# Patient Record
Sex: MALE | Race: WHITE | NOT HISPANIC OR LATINO | URBAN - METROPOLITAN AREA
[De-identification: names, ages, dates, MRNs, and addresses within clinical notes are randomized per-mention and may not be internally consistent; named-entity substitution may affect disease eponyms.]

---

## 2021-05-25 ENCOUNTER — RECORDS - HEALTHEAST (OUTPATIENT)
Dept: ADMINISTRATIVE | Facility: CLINIC | Age: 46
End: 2021-05-25

## 2021-05-28 ENCOUNTER — RECORDS - HEALTHEAST (OUTPATIENT)
Dept: ADMINISTRATIVE | Facility: CLINIC | Age: 46
End: 2021-05-28

## 2025-07-02 ENCOUNTER — HOSPITAL ENCOUNTER (EMERGENCY)
Facility: HOSPITAL | Age: 50
Discharge: PSYCHIATRIC OR SUBSTANCE USE FACILITY | End: 2025-07-03
Attending: EMERGENCY MEDICINE
Payer: COMMERCIAL

## 2025-07-02 DIAGNOSIS — F10.930 ALCOHOL WITHDRAWAL, UNCOMPLICATED (H): ICD-10-CM

## 2025-07-02 DIAGNOSIS — R45.851 SUICIDAL IDEATION: ICD-10-CM

## 2025-07-02 DIAGNOSIS — F10.920 ALCOHOLIC INTOXICATION WITHOUT COMPLICATION: ICD-10-CM

## 2025-07-02 DIAGNOSIS — F14.90 COCAINE USE: ICD-10-CM

## 2025-07-02 PROBLEM — F10.20 ALCOHOL USE DISORDER, SEVERE, DEPENDENCE (H): Status: ACTIVE | Noted: 2025-07-02

## 2025-07-02 PROBLEM — F33.9 RECURRENT MAJOR DEPRESSION: Status: ACTIVE | Noted: 2025-07-02

## 2025-07-02 LAB
ALBUMIN SERPL BCG-MCNC: 4.8 G/DL (ref 3.5–5.2)
ALP SERPL-CCNC: 86 U/L (ref 40–150)
ALT SERPL W P-5'-P-CCNC: 54 U/L (ref 0–70)
ANION GAP SERPL CALCULATED.3IONS-SCNC: 18 MMOL/L (ref 7–15)
AST SERPL W P-5'-P-CCNC: 45 U/L (ref 0–45)
BILIRUB SERPL-MCNC: 0.4 MG/DL
BUN SERPL-MCNC: 12.1 MG/DL (ref 6–20)
CALCIUM SERPL-MCNC: 9.1 MG/DL (ref 8.8–10.4)
CHLORIDE SERPL-SCNC: 100 MMOL/L (ref 98–107)
CREAT SERPL-MCNC: 0.85 MG/DL (ref 0.67–1.17)
EGFRCR SERPLBLD CKD-EPI 2021: >90 ML/MIN/1.73M2
ERYTHROCYTE [DISTWIDTH] IN BLOOD BY AUTOMATED COUNT: 12.9 % (ref 10–15)
ETHANOL SERPL-MCNC: 0.28 G/DL
GLUCOSE SERPL-MCNC: 104 MG/DL (ref 70–99)
HCO3 SERPL-SCNC: 22 MMOL/L (ref 22–29)
HCT VFR BLD AUTO: 43.5 % (ref 40–53)
HGB BLD-MCNC: 15.6 G/DL (ref 13.3–17.7)
MAGNESIUM SERPL-MCNC: 2.2 MG/DL (ref 1.7–2.3)
MCH RBC QN AUTO: 32.3 PG (ref 26.5–33)
MCHC RBC AUTO-ENTMCNC: 35.9 G/DL (ref 31.5–36.5)
MCV RBC AUTO: 90 FL (ref 78–100)
PLATELET # BLD AUTO: 191 10E3/UL (ref 150–450)
POTASSIUM SERPL-SCNC: 4.1 MMOL/L (ref 3.4–5.3)
PROT SERPL-MCNC: 7.5 G/DL (ref 6.4–8.3)
RBC # BLD AUTO: 4.83 10E6/UL (ref 4.4–5.9)
SODIUM SERPL-SCNC: 140 MMOL/L (ref 135–145)
WBC # BLD AUTO: 5.7 10E3/UL (ref 4–11)

## 2025-07-02 PROCEDURE — 84100 ASSAY OF PHOSPHORUS: CPT | Performed by: EMERGENCY MEDICINE

## 2025-07-02 PROCEDURE — 250N000013 HC RX MED GY IP 250 OP 250 PS 637: Performed by: EMERGENCY MEDICINE

## 2025-07-02 PROCEDURE — 99285 EMERGENCY DEPT VISIT HI MDM: CPT | Performed by: EMERGENCY MEDICINE

## 2025-07-02 PROCEDURE — 82077 ASSAY SPEC XCP UR&BREATH IA: CPT | Performed by: EMERGENCY MEDICINE

## 2025-07-02 PROCEDURE — 80053 COMPREHEN METABOLIC PANEL: CPT | Performed by: EMERGENCY MEDICINE

## 2025-07-02 PROCEDURE — 83735 ASSAY OF MAGNESIUM: CPT | Performed by: EMERGENCY MEDICINE

## 2025-07-02 PROCEDURE — 36415 COLL VENOUS BLD VENIPUNCTURE: CPT | Performed by: EMERGENCY MEDICINE

## 2025-07-02 PROCEDURE — 85027 COMPLETE CBC AUTOMATED: CPT | Performed by: EMERGENCY MEDICINE

## 2025-07-02 RX ORDER — DIAZEPAM 5 MG/1
5 TABLET ORAL ONCE
Status: COMPLETED | OUTPATIENT
Start: 2025-07-02 | End: 2025-07-02

## 2025-07-02 RX ADMIN — DIAZEPAM 5 MG: 5 TABLET ORAL at 21:42

## 2025-07-02 ASSESSMENT — COLUMBIA-SUICIDE SEVERITY RATING SCALE - C-SSRS
6. HAVE YOU EVER DONE ANYTHING, STARTED TO DO ANYTHING, OR PREPARED TO DO ANYTHING TO END YOUR LIFE?: YES
2. HAVE YOU ACTUALLY HAD ANY THOUGHTS OF KILLING YOURSELF IN THE PAST MONTH?: YES
4. HAVE YOU HAD THESE THOUGHTS AND HAD SOME INTENTION OF ACTING ON THEM?: NO
5. HAVE YOU STARTED TO WORK OUT OR WORKED OUT THE DETAILS OF HOW TO KILL YOURSELF? DO YOU INTEND TO CARRY OUT THIS PLAN?: NO
3. HAVE YOU BEEN THINKING ABOUT HOW YOU MIGHT KILL YOURSELF?: YES

## 2025-07-02 ASSESSMENT — ACTIVITIES OF DAILY LIVING (ADL)
ADLS_ACUITY_SCORE: 41

## 2025-07-03 ENCOUNTER — TELEPHONE (OUTPATIENT)
Dept: BEHAVIORAL HEALTH | Facility: CLINIC | Age: 50
End: 2025-07-03
Payer: COMMERCIAL

## 2025-07-03 ENCOUNTER — HOSPITAL ENCOUNTER (INPATIENT)
Facility: CLINIC | Age: 50
End: 2025-07-03
Attending: PSYCHIATRY & NEUROLOGY | Admitting: PSYCHIATRY & NEUROLOGY
Payer: COMMERCIAL

## 2025-07-03 VITALS
DIASTOLIC BLOOD PRESSURE: 94 MMHG | SYSTOLIC BLOOD PRESSURE: 157 MMHG | HEART RATE: 76 BPM | OXYGEN SATURATION: 96 % | RESPIRATION RATE: 16 BRPM | TEMPERATURE: 97.5 F

## 2025-07-03 VITALS
DIASTOLIC BLOOD PRESSURE: 97 MMHG | HEART RATE: 65 BPM | RESPIRATION RATE: 12 BRPM | WEIGHT: 239.5 LBS | TEMPERATURE: 98.3 F | SYSTOLIC BLOOD PRESSURE: 172 MMHG | BODY MASS INDEX: 36.42 KG/M2 | OXYGEN SATURATION: 94 %

## 2025-07-03 DIAGNOSIS — F10.20 ALCOHOL USE DISORDER, SEVERE, DEPENDENCE (H): ICD-10-CM

## 2025-07-03 DIAGNOSIS — F33.9 RECURRENT MAJOR DEPRESSIVE DISORDER, REMISSION STATUS UNSPECIFIED: Primary | ICD-10-CM

## 2025-07-03 PROBLEM — R45.851 SUICIDAL IDEATION: Status: ACTIVE | Noted: 2025-07-03

## 2025-07-03 LAB
AMPHETAMINES UR QL SCN: ABNORMAL
BARBITURATES UR QL SCN: ABNORMAL
BENZODIAZ UR QL SCN: ABNORMAL
BZE UR QL SCN: ABNORMAL
CANNABINOIDS UR QL SCN: ABNORMAL
FENTANYL UR QL: ABNORMAL
GLUCOSE BLDC GLUCOMTR-MCNC: 112 MG/DL (ref 70–99)
OPIATES UR QL SCN: ABNORMAL
PCP QUAL URINE (ROCHE): ABNORMAL
PHOSPHATE SERPL-MCNC: 3.6 MG/DL (ref 2.5–4.5)

## 2025-07-03 PROCEDURE — 258N000003 HC RX IP 258 OP 636: Performed by: EMERGENCY MEDICINE

## 2025-07-03 PROCEDURE — 250N000013 HC RX MED GY IP 250 OP 250 PS 637: Performed by: EMERGENCY MEDICINE

## 2025-07-03 PROCEDURE — 96360 HYDRATION IV INFUSION INIT: CPT | Performed by: EMERGENCY MEDICINE

## 2025-07-03 PROCEDURE — 99222 1ST HOSP IP/OBS MODERATE 55: CPT | Performed by: PHYSICIAN ASSISTANT

## 2025-07-03 PROCEDURE — 82962 GLUCOSE BLOOD TEST: CPT

## 2025-07-03 PROCEDURE — 250N000013 HC RX MED GY IP 250 OP 250 PS 637: Performed by: PSYCHIATRY & NEUROLOGY

## 2025-07-03 PROCEDURE — 80307 DRUG TEST PRSMV CHEM ANLYZR: CPT | Performed by: EMERGENCY MEDICINE

## 2025-07-03 PROCEDURE — 128N000001 HC R&B CD/MH ADULT

## 2025-07-03 RX ORDER — GABAPENTIN 300 MG/1
900 CAPSULE ORAL EVERY 8 HOURS
Status: DISCONTINUED | OUTPATIENT
Start: 2025-07-03 | End: 2025-07-03 | Stop reason: HOSPADM

## 2025-07-03 RX ORDER — DIAZEPAM 10 MG/2ML
5-10 INJECTION, SOLUTION INTRAMUSCULAR; INTRAVENOUS EVERY 30 MIN PRN
Status: DISCONTINUED | OUTPATIENT
Start: 2025-07-03 | End: 2025-07-03 | Stop reason: HOSPADM

## 2025-07-03 RX ORDER — GABAPENTIN 300 MG/1
600 CAPSULE ORAL EVERY 8 HOURS
Status: DISCONTINUED | OUTPATIENT
Start: 2025-07-06 | End: 2025-07-03 | Stop reason: HOSPADM

## 2025-07-03 RX ORDER — OLANZAPINE 10 MG/1
10 TABLET, FILM COATED ORAL 3 TIMES DAILY PRN
Status: ACTIVE | OUTPATIENT
Start: 2025-07-03

## 2025-07-03 RX ORDER — DIAZEPAM 5 MG/1
5 TABLET ORAL ONCE
Status: COMPLETED | OUTPATIENT
Start: 2025-07-03 | End: 2025-07-03

## 2025-07-03 RX ORDER — CLONIDINE HYDROCHLORIDE 0.1 MG/1
0.1 TABLET ORAL 2 TIMES DAILY PRN
Status: ACTIVE | OUTPATIENT
Start: 2025-07-03

## 2025-07-03 RX ORDER — GABAPENTIN 400 MG/1
1200 CAPSULE ORAL ONCE
Status: COMPLETED | OUTPATIENT
Start: 2025-07-03 | End: 2025-07-03

## 2025-07-03 RX ORDER — OLANZAPINE 5 MG/1
5-10 TABLET, ORALLY DISINTEGRATING ORAL EVERY 6 HOURS PRN
Status: DISCONTINUED | OUTPATIENT
Start: 2025-07-03 | End: 2025-07-03 | Stop reason: HOSPADM

## 2025-07-03 RX ORDER — SERTRALINE HYDROCHLORIDE 100 MG/1
200 TABLET, FILM COATED ORAL DAILY
Status: ON HOLD | COMMUNITY

## 2025-07-03 RX ORDER — HALOPERIDOL 5 MG/ML
2.5-5 INJECTION INTRAMUSCULAR EVERY 6 HOURS PRN
Status: DISCONTINUED | OUTPATIENT
Start: 2025-07-03 | End: 2025-07-03 | Stop reason: HOSPADM

## 2025-07-03 RX ORDER — DIAZEPAM 5 MG/1
5-20 TABLET ORAL EVERY 30 MIN PRN
Status: ACTIVE | OUTPATIENT
Start: 2025-07-03

## 2025-07-03 RX ORDER — ONDANSETRON 4 MG/1
4 TABLET, ORALLY DISINTEGRATING ORAL EVERY 6 HOURS PRN
Status: ACTIVE | OUTPATIENT
Start: 2025-07-03

## 2025-07-03 RX ORDER — MAGNESIUM HYDROXIDE/ALUMINUM HYDROXICE/SIMETHICONE 120; 1200; 1200 MG/30ML; MG/30ML; MG/30ML
30 SUSPENSION ORAL EVERY 4 HOURS PRN
Status: ACTIVE | OUTPATIENT
Start: 2025-07-03

## 2025-07-03 RX ORDER — ACETAMINOPHEN 325 MG/1
650 TABLET ORAL EVERY 4 HOURS PRN
Status: DISPENSED | OUTPATIENT
Start: 2025-07-03

## 2025-07-03 RX ORDER — OLANZAPINE 10 MG/2ML
10 INJECTION, POWDER, FOR SOLUTION INTRAMUSCULAR 3 TIMES DAILY PRN
Status: ACTIVE | OUTPATIENT
Start: 2025-07-03

## 2025-07-03 RX ORDER — LOPERAMIDE HYDROCHLORIDE 2 MG/1
2 CAPSULE ORAL 4 TIMES DAILY PRN
Status: ACTIVE | OUTPATIENT
Start: 2025-07-03

## 2025-07-03 RX ORDER — GABAPENTIN 300 MG/1
300 CAPSULE ORAL EVERY 8 HOURS
Status: DISCONTINUED | OUTPATIENT
Start: 2025-07-08 | End: 2025-07-03 | Stop reason: HOSPADM

## 2025-07-03 RX ORDER — TRAZODONE HYDROCHLORIDE 50 MG/1
50 TABLET ORAL
Status: ON HOLD | COMMUNITY

## 2025-07-03 RX ORDER — FOLIC ACID 1 MG/1
1 TABLET ORAL DAILY
Status: DISPENSED | OUTPATIENT
Start: 2025-07-03

## 2025-07-03 RX ORDER — FLUMAZENIL 0.1 MG/ML
0.2 INJECTION, SOLUTION INTRAVENOUS
Status: DISCONTINUED | OUTPATIENT
Start: 2025-07-03 | End: 2025-07-03 | Stop reason: HOSPADM

## 2025-07-03 RX ORDER — DIAZEPAM 10 MG/1
10 TABLET ORAL EVERY 30 MIN PRN
Status: DISCONTINUED | OUTPATIENT
Start: 2025-07-03 | End: 2025-07-03 | Stop reason: HOSPADM

## 2025-07-03 RX ORDER — MULTIPLE VITAMINS W/ MINERALS TAB 9MG-400MCG
1 TAB ORAL DAILY
Status: DISCONTINUED | OUTPATIENT
Start: 2025-07-03 | End: 2025-07-03 | Stop reason: HOSPADM

## 2025-07-03 RX ORDER — GABAPENTIN 100 MG/1
100 CAPSULE ORAL EVERY 8 HOURS
Status: DISCONTINUED | OUTPATIENT
Start: 2025-07-10 | End: 2025-07-03 | Stop reason: HOSPADM

## 2025-07-03 RX ORDER — FOLIC ACID 1 MG/1
1 TABLET ORAL DAILY
Status: DISCONTINUED | OUTPATIENT
Start: 2025-07-03 | End: 2025-07-03 | Stop reason: HOSPADM

## 2025-07-03 RX ORDER — CLONIDINE HYDROCHLORIDE 0.1 MG/1
0.1 TABLET ORAL EVERY 8 HOURS SCHEDULED
Status: DISCONTINUED | OUTPATIENT
Start: 2025-07-03 | End: 2025-07-03 | Stop reason: HOSPADM

## 2025-07-03 RX ORDER — MULTIPLE VITAMINS W/ MINERALS TAB 9MG-400MCG
1 TAB ORAL DAILY
Status: DISPENSED | OUTPATIENT
Start: 2025-07-03

## 2025-07-03 RX ORDER — HYDROXYZINE HYDROCHLORIDE 50 MG/1
50 TABLET, FILM COATED ORAL EVERY 6 HOURS PRN
Status: ON HOLD | COMMUNITY

## 2025-07-03 RX ORDER — HYDROXYZINE HYDROCHLORIDE 25 MG/1
25 TABLET, FILM COATED ORAL EVERY 4 HOURS PRN
Status: DISPENSED | OUTPATIENT
Start: 2025-07-03

## 2025-07-03 RX ORDER — TRAZODONE HYDROCHLORIDE 50 MG/1
50 TABLET ORAL
Status: ACTIVE | OUTPATIENT
Start: 2025-07-03

## 2025-07-03 RX ORDER — AMOXICILLIN 250 MG
1 CAPSULE ORAL 2 TIMES DAILY PRN
Status: ACTIVE | OUTPATIENT
Start: 2025-07-03

## 2025-07-03 RX ADMIN — Medication 1 TABLET: at 14:49

## 2025-07-03 RX ADMIN — HYDROXYZINE HYDROCHLORIDE 25 MG: 25 TABLET, FILM COATED ORAL at 14:49

## 2025-07-03 RX ADMIN — SODIUM CHLORIDE 1000 ML: 0.9 INJECTION, SOLUTION INTRAVENOUS at 09:24

## 2025-07-03 RX ADMIN — FOLIC ACID 1 MG: 1 TABLET ORAL at 14:49

## 2025-07-03 RX ADMIN — ACETAMINOPHEN 650 MG: 325 TABLET ORAL at 14:49

## 2025-07-03 RX ADMIN — FOLIC ACID 1 MG: 1 TABLET ORAL at 09:08

## 2025-07-03 RX ADMIN — GABAPENTIN 1200 MG: 400 CAPSULE ORAL at 09:07

## 2025-07-03 RX ADMIN — DIAZEPAM 10 MG: 10 TABLET ORAL at 09:32

## 2025-07-03 RX ADMIN — THIAMINE HCL TAB 100 MG 100 MG: 100 TAB at 14:49

## 2025-07-03 RX ADMIN — DIAZEPAM 5 MG: 5 TABLET ORAL at 00:51

## 2025-07-03 RX ADMIN — CLONIDINE HYDROCHLORIDE 0.1 MG: 0.1 TABLET ORAL at 09:14

## 2025-07-03 RX ADMIN — Medication 1 TABLET: at 09:08

## 2025-07-03 RX ADMIN — THIAMINE HCL TAB 100 MG 100 MG: 100 TAB at 09:08

## 2025-07-03 ASSESSMENT — ACTIVITIES OF DAILY LIVING (ADL)
ADLS_ACUITY_SCORE: 41
ADLS_ACUITY_SCORE: 15
ADLS_ACUITY_SCORE: 20
ADLS_ACUITY_SCORE: 41
ADLS_ACUITY_SCORE: 20
ADLS_ACUITY_SCORE: 41
ADLS_ACUITY_SCORE: 41
ADLS_ACUITY_SCORE: 20
ADLS_ACUITY_SCORE: 20
LAUNDRY: UNABLE TO COMPLETE
ADLS_ACUITY_SCORE: 41
DRESS: SCRUBS (BEHAVIORAL HEALTH)
ADLS_ACUITY_SCORE: 41
ADLS_ACUITY_SCORE: 20
ADLS_ACUITY_SCORE: 41
ORAL_HYGIENE: INDEPENDENT
ADLS_ACUITY_SCORE: 41
ADLS_ACUITY_SCORE: 41
HYGIENE/GROOMING: INDEPENDENT
DRESS: INDEPENDENT;SCRUBS (BEHAVIORAL HEALTH)
ADLS_ACUITY_SCORE: 41
ORAL_HYGIENE: INDEPENDENT
LAUNDRY: WITH SUPERVISION
ADLS_ACUITY_SCORE: 15
ADLS_ACUITY_SCORE: 41
ADLS_ACUITY_SCORE: 15
ADLS_ACUITY_SCORE: 20
ADLS_ACUITY_SCORE: 41
ADLS_ACUITY_SCORE: 41
HYGIENE/GROOMING: INDEPENDENT

## 2025-07-03 ASSESSMENT — LIFESTYLE VARIABLES
NAUSEA AND VOMITING: NO NAUSEA AND NO VOMITING
TREMOR: NO TREMOR
ANXIETY: NO ANXIETY, AT EASE
AUDITORY DISTURBANCES: NOT PRESENT
ORIENTATION AND CLOUDING OF SENSORIUM: ORIENTED AND CAN DO SERIAL ADDITIONS
AGITATION: NORMAL ACTIVITY
TREMOR: 5
ANXIETY: NO ANXIETY, AT EASE
TACTILE DISTURBANCES: MILD ITCHING, PINS AND NEEDLES, BURNING OR NUMBNESS
VISUAL DISTURBANCES: NOT PRESENT
HEADACHE, FULLNESS IN HEAD: NOT PRESENT
PAROXYSMAL SWEATS: BEADS OF SWEAT OBVIOUS ON FOREHEAD
TOTAL SCORE: 12
TOTAL SCORE: 1
AGITATION: SOMEWHAT MORE THAN NORMAL ACTIVITY
SKIP TO QUESTIONS 9-10: 0
PAROXYSMAL SWEATS: NO SWEAT VISIBLE
AUDITORY DISTURBANCES: NOT PRESENT
NAUSEA AND VOMITING: NO NAUSEA AND NO VOMITING
ORIENTATION AND CLOUDING OF SENSORIUM: ORIENTED AND CAN DO SERIAL ADDITIONS
HEADACHE, FULLNESS IN HEAD: NOT PRESENT
VISUAL DISTURBANCES: NOT PRESENT
TACTILE DISTURBANCES: VERY MILD ITCHING, PINS AND NEEDLES, BURNING OR NUMBNESS

## 2025-07-03 ASSESSMENT — PATIENT HEALTH QUESTIONNAIRE - PHQ9: SUM OF ALL RESPONSES TO PHQ9 QUESTIONS 1 & 2: 6

## 2025-07-03 ASSESSMENT — COLUMBIA-SUICIDE SEVERITY RATING SCALE - C-SSRS
6. HAVE YOU EVER DONE ANYTHING, STARTED TO DO ANYTHING, OR PREPARED TO DO ANYTHING TO END YOUR LIFE?: NO
1. SINCE LAST CONTACT, HAVE YOU WISHED YOU WERE DEAD OR WISHED YOU COULD GO TO SLEEP AND NOT WAKE UP?: NO
2. HAVE YOU ACTUALLY HAD ANY THOUGHTS OF KILLING YOURSELF?: NO
ATTEMPT SINCE LAST CONTACT: NO
TOTAL  NUMBER OF ABORTED OR SELF INTERRUPTED ATTEMPTS SINCE LAST CONTACT: NO
TOTAL  NUMBER OF INTERRUPTED ATTEMPTS SINCE LAST CONTACT: NO
SUICIDE, SINCE LAST CONTACT: NO

## 2025-07-03 NOTE — PHARMACY-ADMISSION MEDICATION HISTORY
Please see Admission Medication History note completed on 7/3 under previous encounter for information regarding prior to admission medications.    Malathi Liu, SavannahD

## 2025-07-03 NOTE — PLAN OF CARE
Goal Outcome Evaluation:    Plan of Care Reviewed With: patient      S-(situation): Voluntary admit from [facility] ED for alcohol use    B-(background): Patient presents here for alcohol detox.  First admit to 3 A per patient; has being to other treatment but not [facility].  Patient currently reports drinking 1L vodka a day for a while.  Last use was prior to admission.  Patient denies history of DT's, denies history of seizures.      Arrived at ED with ETOH of 0.28.  Per reports patient endorses acute mental health or medical concerns.  Suicidal ideation with no plan but vague thoughts but reports he wouldn't  do anything because it would affect too many people.  Medically cleared.  Patient denies other drug use.  Endorsing no symptoms of withdrawals.    A-(assessment): Patient pleasant and cooperative upon arrived the unit.  Flat affect, denies SI/SIB, endorsed depression/anxiety.  Per patient, denied suicidal thoughts, toney for safety on this unit.      MSSA on admission 4, no valium given.  Received prn hydroxyzine for anxiety, tylenol for pain.  Good appetite, medication compliant.  Patient was seen by internal medicine.    COVID Symptoms: No, if yes, COVID test required.    Utox: Positive for cocaine and benzo's    Magnesium: WNL    CBC: WNL    HCG: N/A    Vitals:  T 98.1 /  R  16 /  HR  68 /  B/P 160/92  /  02 sat 96% Ra     R-(recommendations): MSSA with valium    Lab draw: 7/3/25:  TSH, Lipids, Hgb A1C    Psychiatry, internal medicine, case management to meet with patient    Patient would like to go to treatment after detox.

## 2025-07-03 NOTE — ED NOTES
He was informed he was going to a locked unit where he is not allowed to have a cell phone on the unit. He does not have a cell phone at this time. He is agreeable with the plan for the locked unit.

## 2025-07-03 NOTE — ED TRIAGE NOTES
Pt comes in today with police escorts with suicidal ideation, denies plan at this time. Patient states he has been binge drinking with his last drink about an hour ago. Endorses 1L vodka today. [facility] and was isolating and someone called for his well being. Alert and oriented, cooperative at this time.      Triage Assessment (Adult)       Row Name 07/02/25 1948          Triage Assessment    Airway WDL WDL        Respiratory WDL    Respiratory WDL WDL        Skin Circulation/Temperature WDL    Skin Circulation/Temperature WDL WDL        Cardiac WDL    Cardiac WDL WDL        Peripheral/Neurovascular WDL    Peripheral Neurovascular WDL WDL        Cognitive/Neuro/Behavioral WDL    Cognitive/Neuro/Behavioral WDL WDL

## 2025-07-03 NOTE — ED PROVIDER NOTES
"EMERGENCY DEPARTMENT ENCOUNTER      NAME: Everette Gomez  AGE: 49 year old male  YOB: 1975  EVALUATION DATE & TIME: 7/2/2025  8:36 PM    ED PROVIDER: Betty Suero MD    Chief Complaint   Patient presents with    Suicidal       FINAL IMPRESSION  1. Alcoholic intoxication without complication    2. Suicidal ideation        MEDICAL DECISION MAKING   Everette Gomez is a 49 year old male who presents via EMS  with police escort for evaluation of suicidal ideation and alcohol intoxication/withdrawal.  Patient reports that he has been struggling with his mental health and alcohol use.  He reports that he has been drinking half to 1 L of vodka per day with last drink being just prior to arrival.  He has a history of alcohol withdrawal and reports that in the past, he had to be put into a \"medically induced coma\" to manage this.  He denies history of seizures.  He does feel like he might be experiencing some symptoms of withdrawal now and describes abdominal discomfort and shakiness.  Second, patient endorses having ongoing thoughts of harming himself without a specific plan, though has had thoughts of jumping off a bridge, jumping off a building, and slitting his wrists.  He does not have any report of hallucinations or homicidal ideation.  He reports that he has been taking medications for his mental health, though when asked to clarify, he states that he actually has been drinking alcohol to help with his mood rather than taking medications.  He denies other drug use.    I considered a broad differential including but not limited to alcohol or other substance intoxication/withdrawal, ROJELIO, gastritis, gastroenteritis, pancreatitis, hepatobiliary disease. Discussed workup and management with patient. We agreed on plan for labs, PO anxiolytic. Will also have patient meet with DEC team to obtain additional information and collateral given report of SI with plans. Patient currently voluntary but I do believe " would meet criteria for a hold if he attempts to leave.     ED Course as of 07/03/25 0439   Wed Jul 02, 2025 2302 Ethanol Level Blood(!): 0.28  Elevated and consistent with patient report of having consumed alcohol just prior to arrival.  With this, I have lower suspicion for alcohol withdrawal.   2302 Comprehensive metabolic panel(!)  CMP with mild elevation of anion gap at 18 but otherwise unremarkable without evidence of kidney injury, electrolyte derangement, or hepatobiliary disease.   2302 CBC (+ platelets, no diff)  CBC reassuring. No evidence of leukocytosis to suggest systemic infectious/inflammatory process. No acute anemia. PLTs wnl.   2303 Magnesium: 2.2  Within normal limits, reassuring.     Workup was notable for the above. I rechecked the patient multiple times and reviewed results.  Patient had improvement in anxiety and symptoms after a dose of Valium and did not display any obvious symptoms of withdrawal.      Patient was evaluated by Radha with DEC team who obtained additional information and collateral. Please see that note for details. We discussed history, presenting complaints/symptoms, and options for management.  Patient would be a good candidate for MICD treatment at [facility] and he was comfortable with this recommendation.  I spoke with staff at [facility] who will work on arranging bed placement.  Patient was signed out to night ED provider pending transfer to [facility] for admission.  He is currently here on a voluntary basis and I do believe would meet criteria for hold if he attempts to leave.      Additional Considerations in MDM  History:  Supplemental history from: PD  External Record(s) reviewed: 11/30/2023 [facility] - PCP visit     Work Up:  Chart documentation includes differential diagnoses considered and any EKGs or imaging independently interpreted as specified above.   In additional to work up documented, I considered additional advanced imaging and laboratory workup  "but deferred after shared decision making conversation with patient/family    External Consultation(s):  Discussion of management with another provider as documented above and in ED course     Chronic Illness(es):  Care impacted by chronic illness(es): Alcohol and/or Drug Abuse or Dependence    Disposition considerations: Transfer for admission    MIPS: Not Applicable       Sepsis/STEMI/Stroke: None        ED COURSE  9:31 PM I met with the patient, obtained history, performed an initial exam, and discussed options and plan for diagnostics and treatment here in the ED.   12:37 AM I rechecked and updated the patient on results.    12:39 AM I spoke with the DEC . She recommends that we send the patient to [facility].   1:31 AM I attempted to call [facility]. Waited on hold. Will try again later.   2:08 AM I spoke again with [facility]. They are working on getting a bed for the patient.       MEDICATIONS GIVEN IN THE ED  Medications   diazepam (VALIUM) tablet 5 mg (5 mg Oral $Given 7/2/25 2142)   diazepam (VALIUM) tablet 5 mg (5 mg Oral $Given 7/3/25 0051)       NEW PRESCRIPTIONS STARTED AT TODAY'S VISIT  New Prescriptions    No medications on file          =================================================================    HPI:    Use of : N/A      Everette Gomez is a 49 year old male who presents with depression.     When asked why he is here, the patient states \"I'm a loser\" and says \"maybe I came by mistake\". He has a flat affect. He endorses depression stating \"I don't feel like I wanna live\" and when asked how we could help, he calmly says \"euthanization would be great\". He does not have a plan and elaborates that he is concerned about the trauma that others would have finding him if he killed himself. He says \"anything you do harms other people\" and says that jumping off of a bridge or tall building would mean that someone else would have to find his \"splat\". He also discusses " "how other methods of self harm are \"too slow\" and that he would \"feel bad for the mary finding me sitting in a pool of blood\" if he cut his wrists.     The patient has not been taking prescribed medications. Instead, he says that the medicine he takes for mental health is alcohol. He admits that he has \"a problem with the alcohol and mental health stuff\". He drinks 0.5-1L of vodka each day. His last drink was just prior to PD arrival earlier today. Right now he says he feels like he is going withdrawal. His symptoms include RUQ abdominal pain, \"shakes\", and \"it feels like every muscle in my body wants to flex\". He has never had an alcohol withdrawal seizure. He reports a history of a \"medically induced coma for withdrawal\" in the past at [facility]. He denies any other complaints at this time.       Per PD, the patient lives in a veterans home and was isolating so someone called PD for a wellness check. He reported recent binge drinking (~1L today) with most recent drink 1 hour prior to arrival. Alert, oriented, and cooperative with PD. In triage he endorsed suicidal ideation but no plan.       RELEVANT HISTORY, MEDICATIONS, & ALLERGIES   Past medical history, surgical history, family history, medications, and allergies reviewed and pertinent noted in HPI.    REVIEW OF SYSTEMS:  A complete review of systems was performed with pertinent positives and negatives noted in the HPI.     PHYSICAL EXAM:    Vitals: BP (!) 161/105   Pulse 92   Temp 97.5  F (36.4  C) (Oral)   Resp 20   Wt 108.6 kg (239 lb 8 oz)   SpO2 97%   BMI 36.42 kg/m    General: Awake, alert, interactive. Standing up at bedside. Pacing. Appears anxious and paranoid.   HENT: Atraumatic. MMM.   Neck: Full AROM.  Cardiovascular: Regular rate.  Respiratory/Chest: Normal work of breathing.   Abdomen: Non-distended.   Musculoskeletal: Normal appearing extremities without obvious deformities or signs of trauma.   Skin: Normal color. No rash or " "diaphoresis.  Neurologic: Alert, oriented. Speech clear. CN's grossly intact. Moving all extremities spontaneously.   Psychiatric: Patient reports mood as \"depressed.\" Affect appears flat,  congruent with mood. Eye contact fair. Does not appear to be responding to internal stimuli but appears paranoid. Thought process and content organized, no delusions. Speech normal, not tangential or pressured. Endorses passive suicidal ideation with various plan. Denies homicidal ideation, visual hallucinations, or auditory hallucinations.        LAB  Labs Ordered and Resulted from Time of ED Arrival to Time of ED Departure   COMPREHENSIVE METABOLIC PANEL - Abnormal       Result Value    Sodium 140      Potassium 4.1      Carbon Dioxide (CO2) 22      Anion Gap 18 (*)     Urea Nitrogen 12.1      Creatinine 0.85      GFR Estimate >90      Calcium 9.1      Chloride 100      Glucose 104 (*)     Alkaline Phosphatase 86      AST 45      ALT 54      Protein Total 7.5      Albumin 4.8      Bilirubin Total 0.4     ETHANOL LEVEL BLOOD - Abnormal    Ethanol Level Blood 0.28 (*)    MAGNESIUM - Normal    Magnesium 2.2     CBC WITH PLATELETS - Normal    WBC Count 5.7      RBC Count 4.83      Hemoglobin 15.6      Hematocrit 43.5      MCV 90      MCH 32.3      MCHC 35.9      RDW 12.9      Platelet Count 191         RADIOLOGY  No orders to display           I, Mitchell Ewing, am serving as a scribe to document services personally performed by Dr. Betty Suero based on my observation and the provider's statements to me. I, Betty Suero MD attest that Mithcell Ewing is acting in a scribe capacity, has observed my performance of the services and has documented them in accordance with my direction.    Betty Suero M.D.  Emergency Medicine  [facility] EMERGENCY DEPARTMENT  [address] [place] [id number]  [phone]  Dept: [phone]     Betty Suero MD  07/03/25 0439    "

## 2025-07-03 NOTE — ED NOTES
He is currently sleeping. He is under 1:1 via camera at this time. Waiting for placement inpatient mental health.

## 2025-07-03 NOTE — ED PROVIDER NOTES
[facility] EMERGENCY DEPARTMENT   ED Mental Health Observation - Initiation Note    Everette Gomez was placed into observation at 12:39 AM (per Dr. Suero's ED  note) on 7/3/2025 for Mental health crisis.   Patient is expected to be under observation status for a minimum of eight hours.    MD Nata Stout, Nisha Mathias MD  07/03/25 0894

## 2025-07-03 NOTE — CONSULTS
"[facility]  Consult Note - Hospitalist Service  Date of Admission:  7/3/2025  Consult Requested by: Dr. Pang  Reason for Consult: detox    Assessment & Plan   Everette Gomez is a 49 year old male admitted on 7/3/2025. He has a past medical history of alcohol use disorder, HLD, MDD. Presented in the setting of alcohol withdrawal and suicidal ideation. Was admitted to  for detox. Internal medicine consulted for medical comanagement.   ______________________    # Alcohol withdrawal, hx of alcohol use disorder   # MDD, suicidal ideation   # Hypertension in the setting of withdrawal   Presented via EMS  with police escort for evaluation of suicidal ideation and alcohol intoxication/withdrawal. MSSA 4 this shift.  Denied hx of withdrawal seizures. No hx of Dts.  Blood EtOH on arrival was 0.28. Liver enzymes within normal limits. Patient reports that he has been struggling with his mental health and alcohol use. He reports that he has been drinking half to 1 L of vodka per day with last drink being just prior to arrival to the [facility] 07/02. He has a history of alcohol withdrawal and reports that in the past, he had to be put into a \"medically induced coma\" to manage this. On arrival, did have suicidal ideation with thoughts of jumping off a bridge, jumping off a building, and slitting his wrists. UDS positive for benzos and cocaine.   - Continue MSSA with valium   - Folvite, multi-vites, thiamine supplementation   - PTA hydroxyzine PRN, sertraline daily, and trazodone at bedtime PRN per Psychiatry   - Further management per Psychiatry   - Suicide precautions   - PRN clonidine added for hypertension     # Elevated blood pressure  Patient denies personal history of high blood pressure.  Denies headache, vision changes, chest pain, dyspnea.  Suspect elevated blood pressure in the setting of acute withdrawal.  - As needed clonidine 0.1 mg twice daily as needed for elevated " blood pressure  - Please reach out to Medicine team if patient's blood pressure is persistently elevated >180 / >105 and patient is out of withdrawal or if patient develops symptoms related to high blood pressure      # Elevated anion gap   With acidosis. Suspect in the setting of above. No need for recheck at this time.   - Encourage alcohol cessation, PO intake     # Hyperlipidemia  Lipid screen in 2023 demonstrated cholesterol 214, triglycerides 211.   - Not currently on statin  - Follow up with PCP for repeat labs, discussion on ways to lower lipids including alcohol cessation  (referral place)    # Nicotine use disorder: declined NRT, no cravings     Resolved:   # RUQ abdominal discomfort, resolved   Reported as abdominal cramping with loose stools, which has now resolved. LFTs within normal limits and resolution of abdominal pain.   -Please reach out to Medicine team if abdominal pain returns of patient develops nausea, vomiting     Medicine team will sign off at this time. Thank you for the consult. Please reach out to Medicine team if further questions or concerns arise.        The patient's care was discussed with the Bedside Nurse, Patient, and Primary team.    Clinically Significant Risk Factors Present on Admission                                 # Financial/Environmental Concerns:           Jazmine Robbins PA-C  Hospitalist Service  Securely message with [employer] (more info)  Text page via [facility] Paging/Directory   ______________________________________________________________________    Chief Complaint   Withdrawal     History is obtained from the patient    History of Present Illness   Everette Gomez is a 49 year old male who presented to ED 7/2 via EMS  with police escort for evaluation of suicidal ideation and alcohol intoxication/withdrawal.  Patient reports that he has been struggling with his mental health and alcohol use.  He reports that he has been drinking half to 1 L of vodka per day with last  "drink being just prior to arrival.  He has a history of alcohol withdrawal and reports that in the past, he had to be put into a \"medically induced coma\" to manage this.  He denies history of seizures or Dts.     On arrival, patient endorses having ongoing thoughts of harming himself without a specific plan, though has had thoughts of jumping off a bridge, jumping off a building, and slitting his wrists. Though further elaborated that he wouldn't want to cause the trauma to person who would find his body.     The patient lives in a veterans home and was isolating so someone called PD for a wellness check.     Did note abdominal pain on arrival which has since resolved.      Current withdrawal symptoms include tremor, brain fog.  No longer is having any nausea, vomiting, abdominal pain.  No hallucinations and denies headache.  Notes was experiencing abdominal pain and left sided pain when he was at [facility].  Abdominal pain was consistent with cramping.  Notes he had a few loose stools in the setting of alcohol withdrawal.  Notes he has bodyaches with withdrawal previously, notes this is all very typical from prior withdrawal.  All the symptoms all have since resolved.  Denies any black or blood in his stools.  Has not had a bowel movement since this morning.  Speaks in detail about the challenging past few years that he had.  Describes that he started drinking alcohol during the pandemic in 2020 when he was living in an apartment in [place] and then 1 day he decided to leave his emergency room the [facility].  He was then committed at [facility] and underwent treatment which lasted 6 months.  He says that 1 day they came to him while he was in treatment and told him \"it is time for you to leave\".  He noted he was discharged and continue to roam the streets which prompted him to drink.  He notes he has not been taking his medications consistently while he was drinking.  Poor p.o. intake as well.  Notes he has not " "followed up with a primary care doctor and also thinks he may to follow-up with a psychiatrist as this would benefit him.  He needs to get in touch with his .  Denies any other symptoms outside of withdrawal including fever, chills, dizziness, lightheadedness, chest pain, dyspnea, cough, urinary symptoms, swelling in legs or new rashes. States he \"wishes he were dead\", but doesn't have a specific plan or would take action upon this wish.     Past Medical History    No past medical history on file.    Past Surgical History   No past surgical history on file.    Medications   I have reviewed this patient's current medications       Review of Systems    The 10 point Review of Systems is negative other than noted in the HPI or here.     Social History   I have reviewed this patient's social history and updated it with pertinent information if needed.         Family History         Allergies   No Known Allergies     Physical Exam   Vital Signs: Temp: 98.1  F (36.7  C) Temp src: Oral BP: (!) 160/92 Pulse: 68   Resp: 16 SpO2: 96 % O2 Device: None (Room air)    Weight: 0 lbs 0 oz  General: sitting up in bed, alert, cooperative, awake, in no acute distress   HEENT: normocephalic, atraumatic, anicteric sclera, moist mucus membranes  Respiratory: breathing comfortably on room air, clear to auscultation bilaterally without wheezing, crackles, or rhonchi appreciated  Cardiac: regular rate and rhythm with normal S1/S2 without murmurs, clicks, rubs or gallops, 2+ radial pulse on LUE, no signs of peripheral edema bilaterally  GI: soft, non-distended, normoactive bowel sounds, non-tender per palpation  Neuro: grossly non-focal, alert and oriented, normal speech  MSK: no bony deformities, moving all extremities independently  Skin: no rashes or lesions on uncovered surfaces, no jaundice         Medical Decision Making       60 MINUTES SPENT BY ME on the date of service doing chart review, history, exam, documentation & " further activities per the note.      Data   NOTE: Data reviewed over the past 24 hrs contributes toward MDM complexity

## 2025-07-03 NOTE — TELEPHONE ENCOUNTER
9:07am - Dr. Pang accepts pt for [facility]    9:22am - Notified unit of pt in the queue    9:23am - Notified [facility] ED of pt placement and collection of UDS prior to pt transferring to [facility].     [place] Completed S.R    R: Patient Placement to [facility]/MICD/Dr. Pang

## 2025-07-03 NOTE — ED NOTES
Report given to EMS crew.    Update given to [facility] the ambulance is here to bring the patient to them.  The patient did take his lunch with him.

## 2025-07-03 NOTE — PLAN OF CARE
"Everette Gomez  July 3, 2025  Plan of Care Hand-off Note     Patient Recommended Care Path: discharge (Detox with mental health component like [facility] \"[facility]\" [facility]).  If such a detox cannot be located, remain in ED WITH a psych consult in am.      Clinical Substantiation:  After therapeutic assessment, intervention and aftercare planning by ED care team and LM and in consultation with attending provider, the patient's circumstances and mental state were appropriate for outpatient management.      Ruddy is concerned about losing his current [facility] housing but also says he wants to address both his drinking and depression.  He is not actively suicidal but states he has lost a lot of hope that things can get better and states he has some apathy.  He is drinking heavily daily and the depression is untreated.  He has had withdrawal symptoms in past.  He is open to going to a detox with a mental health component.  He says he was on Sertraline in past and is open to getting back on a medication that can address depression.  He is open to returning to  and trying to get back into the stream of life that includes supportive others.    Goals for crisis stabilization:  concerns for withdrawal symptoms; patient safety, manage symptoms    Next steps for Care Team:  Patient Ruddy appears most appropriate for a detox program with MH component like [facility].  He is concerned about losing his VA housing but has contacted his VA counselor.    Treatment Objectives Addressed:  rapport building, identifying and practicing coping strategies, processing feelings, identifying additional supports    Therapeutic Interventions:  Engaged in cognitive restructuring/ reframing, looked at common cognitive distortions and challenged negative thoughts., Engaged in guided discovery, explored patient's perspectives and helped expand them through socratic dialogue., Engaged in activity scheduling and behavioral activation, looking at and reviewing " the prior week's goals, problem solving any barriers and acknowledging successes, as well as setting new goals.    Has a specific means been identified for suicidal.homicide actions: No  If yes, describe:  NA  Explain action steps toward mitigation:  Stays in [facility] housing; uses ETOH to manage emotions, loneliness, others were concerned about him and called 911.   Document completion of mitigation action:  See chart  The follow up action still needed prior to discharge:  Locate detox with MH component.      Patient coping skills attempted to reduce the crisis:  Ruddy isolates, uses ETOH                          Collateral contact information:   None listed     Legal Status: Voluntary/Patient has signed consent for treatment                                                                                                                                 Reviewed court records: yes     Psychiatry Consult: Not at this time (recommended if no detox with MH component is located).     Radha Martin, TERRISW

## 2025-07-03 NOTE — CONSULTS
"Diagnostic Evaluation Consultation  Crisis Assessment    Patient Name: Everette Gomez  Age:  49 year old  Legal Sex: male  Gender Identity: male  Pronouns:      Race: White  Ethnicity: Not  or   Language: English      Patient was assessed: Virtual: [employer]   Crisis Assessment Start Date: 07/03/25  Crisis Assessment Start Time: 0003  Crisis Assessment Stop Time: 0035  Patient location: [facility] Emergency Department                             [id number]    Referral Data and Chief Complaint  Everette Gomez presents to the ED via EMS, via police. Patient is presenting to the ED for the following concerns: Depression, Worsening psychosocial stress, Substance use, Suicidal ideation. Factors that make the mental health crisis life threatening or complex are: Living in [facility], isn't \"supposed\" to drinking; drinking 1L ETOH for at maybe 2 weeks..      Informed Consent and Assessment Methods  Explained the crisis assessment process, including applicable information disclosures and limits to confidentiality, assessed understanding of the process, and obtained consent to proceed with the assessment.  Assessment methods included conducting a formal interview with patient, review of medical records, collaboration with medical staff, and obtaining relevant collateral information from family and community providers when available.  : done     History of the Crisis   Patient Everette Gomez \"Ruddy,\"  is a 49 year old male who presents via EMS  with police escort for evaluation of suicidal ideation and alcohol intoxication/withdrawal.  Patient reports that he has been struggling with his mental health and alcohol use.  He reports that he has been drinking half to 1 L of vodka per day with last drink being just prior to arrival.  He has a history of alcohol withdrawal and reports that in the past, he had to be put into a \"medically induced coma\" to manage this. He does feel like " he might be experiencing some symptoms of withdrawal now and describes abdominal discomfort and shakiness.      Ruddy endorses ongoing thoughts of harming himself without a specific plan, though has had thoughts of jumping off a bridge, jumping off a building, and slitting his wrists.  He does not have any report of hallucinations or homicidal ideation.  He is not taking medications but used to take Sertraline, 200 mg and believes it helped alleviate depression.      He says he went to CD treatment in past but was angry and unhappy sober.  He attended AA and NA for awhile  He prefers NA program and is not opposed to attending again.  He is isolated.   He wants to feel and function better but is having a difficult time with the depression.  He is concerned about withdrawal and drinking again to avoid those symptoms.      He is willing to address MH and CD issues.  He is concerned about losing his VA housing at present but says he is open to going to detox with a MH component and addressing ETOH use there. He notes he has shut himself off from friends and family.  Ruddy was civilly committed in 2023 with [facility] for MI/CD but this was closed.    Brief Psychosocial History  Family:  , Children    Support System:  Other (specify) (VA counselor)  Employment Status:  , previous service, unemployed  Source of Income:  public assistance, other community resources  Financial Environmental Concerns:  unable to afford rent/mortgage  Current Hobbies:  sports/team sports  Barriers in Personal Life:  mental health concerns, lack of companionship, financial concerns, behavioral concerns    Significant Clinical History  Current Anxiety Symptoms:  racing thoughts, excessive worry, anxious  Current Depression/Trauma:  avoidance, sense of doom, withdrawl/isolation, thoughts of death/suicide, sadness, helplessness, hopelessness  Current Somatic Symptoms:  excessive worry, anxious  Current Psychosis/Thought  Disturbance:  inattentive  Current Eating Symptoms:     Chemical Use History:  Alcohol: Daily, Blackouts, Binge  Last Use:: 07/03/25   Past diagnosis:  Depression, Substance Use Disorder  Family history:  Substance Use Disorder  Past treatment:  Supportive Living Environment (group home, shelter house, etc), Other, Individual therapy, AA/NA (IP CD tx)  Details of most recent treatment:  none; does live in [facility]  Other relevant history:  Was sober and attending NA/AA for awhile.  Likes NA better.  Had been on Sertraline for awhile as well, thinks the latter helped depression.    Have there been any medication changes in the past two weeks:  patient is not on psychiatric meds       Is the patient compliant with medications:           Collateral Information  Is there collateral information: No (none listed)     Collateral information name, relationship, phone number:       What happened today:       What is different about patient's functioning:       What do you think the patient needs:      Has patient made comments about wanting to kill themselves/others:      If d/c is recommended, can they take part in safety/aftercare planning:       Additional collateral information:        Risk Assessment  [place] Suicide Severity Rating Scale Full Clinical Version:  Suicidal Ideation  Q1 Wish to be Dead (Lifetime): Yes  Q2 Non-Specific Active Suicidal Thoughts (Lifetime): Yes  3. Active Suicidal Ideation with any Methods (Not Plan) Without Intent to Act (Lifetime): Yes  4. Active Suicidal Ideation with Some Intent to Act, Without Specific Plan (Lifetime): Yes  5. Active Suicidal Ideation with Specific Plan and Intent (Lifetime): Yes  Q6 Suicide Behavior (Lifetime): yes     Suicidal Behavior (Lifetime)  Actual Attempt (Lifetime): No  Has subject engaged in non-suicidal self-injurious behavior? (Lifetime): No    [place] Suicide Severity Rating Scale Recent:   Suicidal Ideation (Recent)  Q1 Wished to be Dead  (Past Month): yes  Q2 Suicidal Thoughts (Past Month): yes  Q3 Suicidal Thought Method: yes  Q4 Suicidal Intent without Specific Plan: no  Q5 Suicide Intent with Specific Plan: no  If yes to Q6, within past 3 months?: no  Level of Risk per Screen: moderate risk     Suicidal Behavior (Recent)  Actual Attempt (Past 3 Months): No  Has subject engaged in non-suicidal self-injurious behavior? (Past 3 Months): No    Environmental or Psychosocial Events: challenging interpersonal relationships, excessive debt, poor finances, other life stressors, work or task failure, ongoing abuse of substances, social isolation, neither working nor attending school  Protective Factors: Protective Factors: lives in a responsibly safe and stable environment, reality testing ability    Does the patient have thoughts of harming others? Feels Like Hurting Others: no  Previous Attempt to Hurt Others: no (much longer than 6 months of actually becoming violent)  Is the patient engaging in sexually inappropriate behavior?: no  Does Patient have a known history of aggressive behavior: Yes  Where/who has aggression been against (people, property, self, etc): people, property  When was the last episode of aggression: Many years ago per [place] court records  Where has the violence occurred (home, community, school): home, community  Trigger to aggression (if known): substances likely played a role  Has aggression occurred as a result of MH concerns/diagnosis: yes, at least in part  Does patient have history of aggression in hospital: none known    Is the patient engaging in sexually inappropriate behavior?  no        Mental Status Exam   Affect: Appropriate, Constricted  Appearance: Appropriate  Attention Span/Concentration: Attentive  Eye Contact: Engaged    Fund of Knowledge: Appropriate   Language /Speech Content: Fluent  Language /Speech Volume: Normal  Language /Speech Rate/Productions: Normal  Recent Memory: Variable  Remote Memory: Intact  Mood:  Anxious, Depressed  Orientation to Person: Yes   Orientation to Place: Yes  Orientation to Time of Day: Yes  Orientation to Date: Yes     Situation (Do they understand why they are here?): Yes  Psychomotor Behavior: Normal  Thought Content: Clear  Thought Form: Intact     Mini-Cog Assessment  Number of Words Recalled:    Clock-Drawing Test:     Three Item Recall:    Mini-Cog Total Score:       Medication  Psychotropic medications:   Medication Orders - Psychiatric (From admission, onward)      None             Current Care Team  Patient Care Team:  No Ref-Primary, Physician as PCP - General    Diagnosis  Patient Active Problem List   Diagnosis Code    Alcohol use disorder, severe, dependence (H) F10.20    Recurrent major depression F33.9       Primary Problem This Admission  Active Hospital Problems    Alcohol use disorder, severe, dependence (H)      *Recurrent major depression        Clinical Summary and Substantiation of Recommendations   Clinical Substantiation:  After therapeutic assessment, intervention and aftercare planning by ED care team and LM and in consultation with attending provider, the patient's circumstances and mental state were appropriate for outpatient management.  Ruddy is concerned about losing his current VA housing but also says he wants to address both his drinking and depression.  He is not actively suicidal but states he has lost a lot of hope that things can get better and states he has some apathy.  He is drinking heavily daily and the depression is untreated.  He has had withdrawal symptoms in past.  He is open to going to a detox with a mental health component.  He says he was on Sertraline in past and is open to getting back on a medication that can address depression.  He is open to returning to  and trying to get back into the stream of life that includes supportive others.    Goals for crisis stabilization:  concerns for withdrawal symptoms; patient safety, manage  "symptoms    Next steps for Care Team:  Patient Ruddy appears most appropriate for a detox program with MH component like [facility].  He is concerned about losing his VA housing but has contacted his VA counselor.    Treatment Objectives Addressed:  rapport building, identifying and practicing coping strategies, processing feelings, identifying additional supports    Therapeutic Interventions:  Engaged in cognitive restructuring/ reframing, looked at common cognitive distortions and challenged negative thoughts., Engaged in guided discovery, explored patient's perspectives and helped expand them through socratic dialogue., Engaged in activity scheduling and behavioral activation, looking at and reviewing the prior week's goals, problem solving any barriers and acknowledging successes, as well as setting new goals.    Has a specific means been identified for suicidal/homicide actions: No    If yes, describe:  NA    Explain action steps toward mitigation:  Stays in VA housing; uses ETOH to manage emotions, loneliness, others were concerned about him and called 911.     Document completion of mitigation action:  See chart    The follow up action still needed prior to discharge:  Locate detox with MH component.      Patient coping skills attempted to reduce the crisis:  Ruddy isolates, uses ETOH.  He is living in VA housing, was on Sertraline in the past, willing to try it again.       Disposition  Recommended referrals: Individual Therapy, Medication Management, [facility] Comprehensive Assessment        Reviewed case and recommendations with attending provider. Attending Name: Betty Suero       Attending concurs with disposition: yes       Patient and/or validated legal guardian concurs with disposition:   yes       Final disposition:  discharge (Detox with mental health component like [facility] \"3A\" unit)                            Legal status: Voluntary/Patient has signed consent for treatment                              "                                                                                                    Reviewed court records: yes       Assessment Details   Total duration spent with the patient: 32 min     CPT code(s) utilized: 45935 - Psychotherapy for Crisis - 60 (30-74*) min    LENNY Estrada, Psychotherapist  [facility] - Triage & Transition Services  Callback: [phone]

## 2025-07-03 NOTE — PROGRESS NOTES
"Triage and Transition Services Extended Care Reassessment     Patient: Everette goes by \"Everette,\" uses he/him pronouns  Date of Service: July 3, 2025  Site of Service: [facility] Emergency Department                             F317-01  Patient was seen yes  Mode of Assessment: Virtual: [employer]     Reason for Reassessment: depression, substance use, intoxication    History of Patient's Original Emergency Room Encounter: Patient Everette Gomez \"Ruddy,\"  is a 49 year old male who presents via EMS  with police escort for evaluation of suicidal ideation and alcohol intoxication/withdrawal.  Patient reports that he has been struggling with his mental health and alcohol use.  He reports that he has been drinking half to 1 L of vodka per day with last drink being just prior to arrival.  He has a history of alcohol withdrawal and reports that in the past, he had to be put into a \"medically induced coma\" to manage this. He does feel like he might be experiencing some symptoms of withdrawal now and describes abdominal discomfort and shakiness.  Ruddy endorses ongoing thoughts of harming himself without a specific plan, though has had thoughts of jumping off a bridge, jumping off a building, and slitting his wrists.  He does not have any report of hallucinations or homicidal ideation.  He is not taking medications but used to take Sertraline, 200 mg and believes it helped alleviate depression.  He says he went to CD treatment in past but was angry and unhappy sober.  He attended AA for awhile  He prefers NA program and is not opposed to attending again.  He is isolated.   He wants to feel and function better but is having a difficult time with the depression.  He is concerned about withdrawal and drinking again to avoid those symptoms.  He is willing to address MH and CD issues.  He is concerned about losing his VA housing at present but says he is open to going to detox with a MH component and addressing " "ETOH use there. He notes he has shut himself off from friends and family.  Ruddy was civilly committed in 2023 with [facility] for MI/CD but this was closed.    Current Patient Presentation: Patient denies active SI, SIB, HI, or AVHs. He seems appropriate for outpatient services. He does not require acute stabilization services.    Presentation Summary: Patient  is seen by extended care for therapeutic check-in and reassessment. Exchanged greeting, introduced self and role. Patient was observed to be able to participate in the assessment as evidenced by verbal consent. Pt presents as calm, cooperative, alert, and oriented x5  Patient is able to engage in meaningful conversation regarding support systems, skills, and hobbies. Patient is able to identify protective factors and future goals. Patient reports feeling like he has been on a downward spiral due to alcohol use and current withdrawals. He says he recently got depressed and drank for 5 straight days. He says \"life sucks.\" He says life has sucked for the last 4 to 5 years. Says he had a full time job, 2 part time jobs, and was going to the gym all before the pandemic. Once the pandemic happened he began drinking a lot. Says he went to [facility] for treatment in 2023 and was sober for 6 months. He says after 6 months they abruptly discharged him without a plan. He went to a sober house which he describes as being a \"scam.\" He says in the sober house he was sharing a room with 2 other guys and paying $650 per month. He did not indicated exactly why he left sober housing but says [place] and lived outdoors. He had been taking Sertraline and after he left sober housing he stopped getting the medication refilled and went through horrible withdrawals, which included: pain, anxiety, sleep issues, nausea, and horrible diarrhea. He endorses having current depression and anxiety. He denies having activie suicidal ideation. He says, \"I don't want to live. I " "am ready to move onto the next level but I am not suicidal.\" He is concerned he might lose his VA housing. He says he would be ok if he was no longer here and struggling with the alcoholism. He denies HI, SIB, and AVHs. He is expressing an interest to continue with the plan for detox and says he is interested in going to a treatment program.    Changes Observed Since Initial Assessment: decrease in presenting symptoms    Therapeutic Interventions Provided: Engaged in guided discovery, explored patient's perspectives and helped expand them through socratic dialogue., Engaged in cognitive restructuring/ reframing, looked at common cognitive distortions and challenged negative thoughts.    Current Symptoms: anxious sense of doom, helplessness, avoidance, decreased libido sweating, flushing, shaking, somatic symptoms (abdominal pain, headache, tension), excessive worry, anxious   loss of appetite    Mental Status Exam   Affect: Appropriate  Appearance: Appropriate  Attention Span/Concentration: Attentive  Eye Contact: Engaged    Fund of Knowledge: Appropriate   Language /Speech Content: Fluent  Language /Speech Volume: Normal  Language /Speech Rate/Productions: Normal  Recent Memory: Intact  Remote Memory: Intact  Mood: Anxious, Depressed  Orientation to Person: Yes   Orientation to Place: Yes  Orientation to Time of Day: Yes  Orientation to Date: Yes     Situation (Do they understand why they are here?): Yes  Psychomotor Behavior: Normal  Thought Content: Clear  Thought Form: Goal Directed    Treatment Objective(s) Addressed: rapport building, orienting the patient to therapy, processing feelings, assessing safety, building skills    Patient Response to Interventions: acceptance expressed, verbalizes understanding    Progress Towards Goals:  Patient Reports Symptoms Are: improving  Patient Progress Toward Goals: is making progress  Comment: Patient was pleasant and engaged in supportive therapy and reassessment. He " seemed open and forthcoming. He is interested in detox and [facility] treatment.    Case Management:      C-SSRS Since Last Contact:   1. Wish to be Dead (Since Last Contact): No  2. Non-Specific Active Suicidal Thoughts (Since Last Contact): No     Actual Attempt (Since Last Contact): No  Has subject engaged in non-suicidal self-injurious behavior? (Since Last Contact): No  Interrupted Attempts (Since Last Contact): No  Aborted or Self-Interrupted Attempt (Since Last Contact): No  Preparatory Acts or Behavior (Since Last Contact): No  Suicide (Since Last Contact): No     Calculated C-SSRS Risk Score (Since Last Contact): No Risk Indicated    Plan: Final Disposition / Recommended Care Path: discharge  Plan for Care reviewed with assigned Medical Provider: yes  Plan for Care Team Review: provider, RN  Comments: Patient is transferring to  for detox.  Patient and/or validated legal guardian concurs: yes    Clinical Substantiation: After therapeutic assessment, intervention and aftercare planning by ED care team and LMHP and in consultation with attending provider, the patient's circumstances and mental state were appropriate for outpatient management. Ruddy is concerned about losing his current VA housing but also says he wants to address both his drinking and depression. He is not actively suicidal but states he has lost a lot of hope that things can get better and states he has some apathy. He is drinking heavily daily and the depression is untreated. He has had withdrawal symptoms in past. He is open to going to a detox with a mental health component. He says he was on Sertraline in past and is open to getting back on a medication that can address depression. He is open to returning to  and trying to get back into the stream of life that includes supportive others.    Legal Status: Legal Status: Voluntary/Patient has signed consent for treatment    Session Status: Time session started: 0937  Time session ended:  1001  Session Duration (minutes): 24 minutes  Session Number: 1  Anticipated number of sessions or this episode of care: 1    Session Start Time: 0937  Session Stop Time: 1001  CPT codes: 22562 - Psychotherapy (with patient) - 30 (16-37*) min  Time Spent: 24 minutes      CPT code(s) utilized: 51880 - Psychotherapy (with patient) - 30 (16-37*) min    Diagnosis:   Patient Active Problem List   Diagnosis Code    Alcohol use disorder, severe, dependence (H) F10.20    Recurrent major depression F33.9       Primary Problem This Admission: Active Hospital Problems    Alcohol use disorder, severe, dependence (H)      *Recurrent major depression    F33.9      Osei Pal [facility]   Licensed Mental Health Professional (LMHP), [facility]  [phone]

## 2025-07-03 NOTE — TELEPHONE ENCOUNTER
S: [facility] ED , Provider Nimo calling at 2:04 AM with clinical on 49 year old/male presenting for alcohol detox.     B: Pt presents for ETOH detox.   Currently reports drinking 1L a day for a while.    Patient reports last use was PTA.  Pt KISHA: 28  Pt  denies hx of DT  Pt  denies hx of seizures. Last seizure: N/A  Pt endorsing the following symptoms of withdrawal: None  MSSA Score: None    Pt endorses acute mental health or medical concerns. - SI w/ no plan but vague thoughts but reports he wouldn't do anything because it would affect too many people. - Medically cleared  Pt denies other drug use: None Amount/frequency: N/A    Does Pt have a detox care plan in [facility]? No  Does pt present with specific needs, assistive devices, or exclusionary criteria? None  Is the patient ambulating, eating and drinking in the ED? Yes    A: Pt meets criteria to be presented for IP detox admission. Patient is voluntary    COVID Symptoms: No  If yes, COVID test required   Utox: Ordered, not yet collected  Magnesium: WNL  CMP: WNL  CBC: WNL  HCG: N/A     R: Patient cleared and ready for behavioral bed placement: Yes    Pt is meeting criteria for presentation to 3A/MICD    Does Patient need a Transfer Center request created? Yes, writer completed Transfer Center request at:     2:13 AM Intake messaged Dr. Pang on teams for MICD review.

## 2025-07-03 NOTE — ED PROVIDER NOTES
[facility] EMERGENCY DEPARTMENT   ED Mental Health Observation - Daily Note for 7/3/2025    Everette Gomez is a 49 year old male currently boarding in the ED while awaiting placement for Mental health crisis.  Please see the initial H&P for this patient's presentation, workup, and disposition plan.     Hold Status:  Patient is Voluntary, but holdable    Plan:  In brief, the patient's presentation is notable for suicidal ideation and alcohol intoxication.  Patient has been struggling with his mental health and alcohol use.  He has a history of alcohol withdrawal.    Patient was seen by DEC overnight and they feel that he would benefit from MICD treatment at [facility].     Patient is awaiting Mental health placement    Interim History:  There were no significant events since last note.    Physical Exam:  BP (!) 188/96   Pulse 65   Temp 98.3  F (36.8  C) (Oral)   Resp 12   Wt 108.6 kg (239 lb 8 oz)   SpO2 94%   BMI 36.42 kg/m    No respiratory distress, on room air   Well perfused  Behavior appropriate    Medications provided prior to my care:  Medications   OLANZapine zydis (zyPREXA) ODT tab 5-10 mg (has no administration in time range)     Or   haloperidol lactate (HALDOL) injection 2.5-5 mg (has no administration in time range)   flumazenil (ROMAZICON) injection 0.2 mg (has no administration in time range)   melatonin tablet 5 mg (has no administration in time range)   cloNIDine (CATAPRES) tablet 0.1 mg (0.1 mg Oral $Given 7/3/25 0914)   diazepam (VALIUM) tablet 10 mg (10 mg Oral $Given 7/3/25 0932)     Or   diazepam (VALIUM) injection 5-10 mg ( Intravenous See Alternative 7/3/25 0932)   gabapentin (NEURONTIN) capsule 900 mg (has no administration in time range)   gabapentin (NEURONTIN) capsule 600 mg (has no administration in time range)   gabapentin (NEURONTIN) capsule 300 mg (has no administration in time range)   gabapentin (NEURONTIN) capsule 100 mg (has no administration in  time range)   multivitamin w/minerals (THERA-VIT-M) tablet 1 tablet (1 tablet Oral $Given 7/3/25 0908)   thiamine (B-1) tablet 100 mg (100 mg Oral $Given 7/3/25 0908)   folic acid (FOLVITE) tablet 1 mg (1 mg Oral $Given 7/3/25 0908)   diazepam (VALIUM) tablet 5 mg (5 mg Oral $Given 7/2/25 2142)   diazepam (VALIUM) tablet 5 mg (5 mg Oral $Given 7/3/25 0051)   gabapentin (NEURONTIN) capsule 1,200 mg (1,200 mg Oral $Given 7/3/25 0907)   sodium chloride 0.9% BOLUS 1,000 mL (0 mLs Intravenous Stopped 7/3/25 1044)       Laboratory (reviewed and interpreted):  Labs Ordered and Resulted from Time of ED Arrival to Time of ED Departure   COMPREHENSIVE METABOLIC PANEL - Abnormal       Result Value    Sodium 140      Potassium 4.1      Carbon Dioxide (CO2) 22      Anion Gap 18 (*)     Urea Nitrogen 12.1      Creatinine 0.85      GFR Estimate >90      Calcium 9.1      Chloride 100      Glucose 104 (*)     Alkaline Phosphatase 86      AST 45      ALT 54      Protein Total 7.5      Albumin 4.8      Bilirubin Total 0.4     ETHANOL LEVEL BLOOD - Abnormal    Ethanol Level Blood 0.28 (*)    URINE DRUG SCREEN PANEL - Abnormal    Amphetamines Urine Screen Negative      Barbituates Urine Screen Negative      Benzodiazepine Urine Screen Positive (*)     Cannabinoids Urine Screen Negative      Cocaine Urine Screen Positive (*)     Fentanyl Qual Urine Screen Negative      Opiates Urine Screen Negative      PCP Urine Screen Negative     GLUCOSE BY METER - Abnormal    GLUCOSE BY METER POCT 112 (*)    MAGNESIUM - Normal    Magnesium 2.2     CBC WITH PLATELETS - Normal    WBC Count 5.7      RBC Count 4.83      Hemoglobin 15.6      Hematocrit 43.5      MCV 90      MCH 32.3      MCHC 35.9      RDW 12.9      Platelet Count 191     PHOSPHORUS - Normal    Phosphorus 3.6     GLUCOSE MONITOR NURSING POCT       ED Course:  8:49 AM  I met with the patient and discussed plan with care team.  Patient was visibly diaphoretic, shaky, and states he does not  feel well.  Very concerning for active alcohol withdrawal.  We will initiate CIWA, get him on repeat vital signs and get him treated appropriately. Will place seizure pads as a precaution.     9:49 AM  Received a call from rGiselda with the intake unit.  He has been accepted to the [facility] facility by Dr. Pang.  I let them know that he is in active withdrawal at this time and that I would like to get him some medications and stabilize him better before sending him to detox..  They are in agreement.  We can call the intake number when he is ready.  If he does get admitted medically then they would like a call to let them know.  I updated the primary RN caring for this patient.    10:45 AM  Patient is feeling much better.  Clinically he appears much better.  Still a little shakiness to his hands but he is no longer diaphoretic it is clinically appears significantly improved.  Blood pressure is coming down.  Heart rate remained stable.    12:53 PM  Patient was able to tolerate lunch.  He was transported by EMS to [facility] for ongoing treatment.      Impression/Plan:  1. Alcoholic intoxication without complication    2. Suicidal ideation    3. Alcohol withdrawal, uncomplicated (H)    4. Cocaine use        MD Nata Stout, Nisha Mathias MD  07/03/25 7401

## 2025-07-03 NOTE — PHARMACY-ADMISSION MEDICATION HISTORY
Pharmacist Admission Medication History    Admission medication history is complete. The information provided in this note is only as accurate as the sources available at the time of the update.    Information Source(s): Patient, Clinic records, Hospital records, and CareEverywhere/SureScripts via in-person    Pertinent Information: it has been about 2 months since taking sertraline. Recommend retitrate.      Changes made to PTA medication list:  Added: sertraline, hydroxyzine, trazodone   Deleted: Protonix   Changed: None    Allergies reviewed with patient and updates made in EHR: yes    Medication History Completed By: Oskar Toscano [facility] 7/3/2025 8:31 AM    PTA Med List   Medication Sig Last Dose/Taking    hydrOXYzine HCl (ATARAX) 50 MG tablet Take 50 mg by mouth every 6 hours as needed for anxiety. Past Month    sertraline (ZOLOFT) 100 MG tablet Take 200 mg by mouth daily. More than a month    traZODone (DESYREL) 50 MG tablet Take 50 mg by mouth nightly as needed for sleep. Past Month

## 2025-07-04 LAB
CHOLEST SERPL-MCNC: 248 MG/DL
EST. AVERAGE GLUCOSE BLD GHB EST-MCNC: 114 MG/DL
HBA1C MFR BLD: 5.6 %
HDLC SERPL-MCNC: 56 MG/DL
HGB BLD-MCNC: 13.1 G/DL (ref 13.3–17.7)
LDLC SERPL CALC-MCNC: ABNORMAL MG/DL
MCV RBC AUTO: 93 FL (ref 78–100)
NONHDLC SERPL-MCNC: 192 MG/DL
TRIGL SERPL-MCNC: 492 MG/DL
TSH SERPL DL<=0.005 MIU/L-ACNC: 3.04 UIU/ML (ref 0.3–4.2)

## 2025-07-04 PROCEDURE — 85018 HEMOGLOBIN: CPT | Performed by: PHYSICIAN ASSISTANT

## 2025-07-04 PROCEDURE — 84443 ASSAY THYROID STIM HORMONE: CPT | Performed by: PSYCHIATRY & NEUROLOGY

## 2025-07-04 PROCEDURE — 80061 LIPID PANEL: CPT | Performed by: PSYCHIATRY & NEUROLOGY

## 2025-07-04 PROCEDURE — 99222 1ST HOSP IP/OBS MODERATE 55: CPT | Mod: AI

## 2025-07-04 PROCEDURE — 83036 HEMOGLOBIN GLYCOSYLATED A1C: CPT | Performed by: PSYCHIATRY & NEUROLOGY

## 2025-07-04 PROCEDURE — 36415 COLL VENOUS BLD VENIPUNCTURE: CPT | Performed by: PSYCHIATRY & NEUROLOGY

## 2025-07-04 PROCEDURE — 250N000013 HC RX MED GY IP 250 OP 250 PS 637

## 2025-07-04 PROCEDURE — HZ2ZZZZ DETOXIFICATION SERVICES FOR SUBSTANCE ABUSE TREATMENT: ICD-10-PCS | Performed by: PSYCHIATRY & NEUROLOGY

## 2025-07-04 PROCEDURE — 99231 SBSQ HOSP IP/OBS SF/LOW 25: CPT | Performed by: PHYSICIAN ASSISTANT

## 2025-07-04 PROCEDURE — 250N000013 HC RX MED GY IP 250 OP 250 PS 637: Performed by: PSYCHIATRY & NEUROLOGY

## 2025-07-04 PROCEDURE — 128N000001 HC R&B CD/MH ADULT

## 2025-07-04 RX ORDER — TRAZODONE HYDROCHLORIDE 50 MG/1
50-100 TABLET ORAL
Status: DISCONTINUED | OUTPATIENT
Start: 2025-07-04 | End: 2025-07-06 | Stop reason: HOSPADM

## 2025-07-04 RX ORDER — HYDROXYZINE HYDROCHLORIDE 25 MG/1
25-50 TABLET, FILM COATED ORAL EVERY 4 HOURS PRN
Status: DISCONTINUED | OUTPATIENT
Start: 2025-07-04 | End: 2025-07-06 | Stop reason: HOSPADM

## 2025-07-04 RX ORDER — SERTRALINE HYDROCHLORIDE 25 MG/1
25 TABLET, FILM COATED ORAL DAILY
Status: DISCONTINUED | OUTPATIENT
Start: 2025-07-04 | End: 2025-07-06 | Stop reason: HOSPADM

## 2025-07-04 RX ADMIN — SERTRALINE HYDROCHLORIDE 25 MG: 25 TABLET ORAL at 13:37

## 2025-07-04 RX ADMIN — FOLIC ACID 1 MG: 1 TABLET ORAL at 13:37

## 2025-07-04 RX ADMIN — DIAZEPAM 10 MG: 5 TABLET ORAL at 00:32

## 2025-07-04 RX ADMIN — TRAZODONE HYDROCHLORIDE 50 MG: 50 TABLET ORAL at 22:51

## 2025-07-04 RX ADMIN — Medication 1 TABLET: at 13:36

## 2025-07-04 RX ADMIN — THIAMINE HCL TAB 100 MG 100 MG: 100 TAB at 13:37

## 2025-07-04 ASSESSMENT — ACTIVITIES OF DAILY LIVING (ADL)
ADLS_ACUITY_SCORE: 20
HYGIENE/GROOMING: INDEPENDENT
ADLS_ACUITY_SCORE: 20

## 2025-07-04 NOTE — PLAN OF CARE
Problem: Adult Behavioral Health Plan of Care  Goal: Plan of Care Review  Outcome: Progressing  Flowsheets (Taken 7/3/2025 1610)  Patient Agreement with Plan of Care: agrees     Problem: Alcohol Withdrawal  Goal: Alcohol Withdrawal Symptom Control  Outcome: Progressing   Goal Outcome Evaluation:    Plan of Care Reviewed With: patient        The patient continues to seek detox from alcohol. MSSA remained at 3 and 4 respectively. No intervention needed. The pt hasn't scored high enough to meet criteria for treatment since his admission to this unit.  He is eating and drinking adequately. No concerns noted. Will continue to monitor and treat.

## 2025-07-04 NOTE — PLAN OF CARE
Problem: Alcohol Withdrawal  Goal: Alcohol Withdrawal Symptom Control  Outcome: Progressing     Problem: Sleep Disturbance  Goal: Adequate Sleep/Rest  Outcome: Progressing    The Patient's MSSA scores were 8 and 4. He received Valium 10 mg once. The Team conducted safety checks every 15 minutes with no issues reported. He did not have any additional stool or BM. His His last BP was 177/99. He did not meet the parameter for prn Clonidine.

## 2025-07-04 NOTE — PROGRESS NOTES
[facility]     Daily Encounter: Met with team, discussed patient progress, discharge plan and any impediments to discharge.    Writer met with the patient to discuss discharge and aftercare planning. Patient reports that he has been drinking 1 liter of vodka daily for the last 2 weeks. Patient reports prior to this, he was sober for a month and his relapse was due to increased feeling of depression. Patient reports that he currently does not have any support, does not attend recovery meetings, and has stopped taking his psychotropic medications months ago. Patient reports that he wants to detox only with plan to schedule an appointment with a psychiatrist to discuss resuming psychotropic medications to address his depression symptoms.    Insurance:  [employer]    Legal Status:  Voluntary    SUDs Assessment Status:   Not needed at this time.     ROIs on file:   None.     Living Situation:   Lives in transitional housing in [place], [place].     Current Providers, Supports & Collateral:    None.    Current Plan/Referral Status:   Detox only.    Safety Plan Status:  Completed with patient and safety plan was printed out and copy given to the patient.    IP referral tracker complete:  Not yet.      RADHA Blancas  [facility] - Adult Inpatient Addiction Psychiatry Unit

## 2025-07-04 NOTE — PLAN OF CARE
The patient reported experiencing bloody stool for the first time. His vital signs were 98.2 F with a pulse of 87 beats per minute, and his oxygen saturation was at 97%. He complained of lightheadedness and was instructed to rise slowly when changing his body position. He verbalized his understanding and is able to hydrate himself orally. The author has informed Dr. Zeng. We will monitor his bowel movements and provide updates to the physician as necessary.

## 2025-07-04 NOTE — DISCHARGE INSTRUCTIONS
Behavioral Discharge Planning and Instructions  THANK YOU FOR CHOOSING [facility]  3[facility]  [phone]    Summary: You were admitted to Station 3A on 7/3/2025 for detoxification from Alcohol.  A medical exam was performed that included lab work. You have met with a [facility] Counselor and opted to detox only.  Please take care and make your recovery a daily priority, Everette!  It was a pleasure working with you and the entire treatment team here wishes you the very best in your recovery!     Recommendation:  It is recommended that you abstain from use of all mood-altering chemicals unless prescribed by a medical professional.     Substance use treatment services were recommended, but you have declined these services at this time. If at any time after discharge you would like to pursue these services at [facility] please contact us at [phone].    Main Diagnoses:    Alcoholic intoxication without complication   Suicidal ideation        Major Treatments, Procedures and Findings:  You were treated for Alcohol detoxification using Valium protocol. As an outpatient you will be prescribed medications, please take this medications as prescribed until it is gone. You have met with a [facility] counselor to develop a treatment plan for discharge. You had labs drawn and those results were reviewed with you. Please take a copy of your lab work with you to your next primary care provider appointment.    Symptoms to Report:  If you experience more anxiety, confusion, sleeplessness, deep sadness or thoughts of suicide, notify your treatment team or notify your primary care provider. IF ANY OF THE SYMPTOMS YOU ARE EXPERIENCING ARE A MEDICAL EMERGENCY CALL 911 IMMEDIATELY.     Lifestyle Adjustment:   Health Action Plan:  1.Create a daily schedule  2. Eat Healthy  3. Plan Enjoyable Sober Activities  4. Use Problem Solving Skills and Deal with Issues as they Arise.   5. Be Physically Active  6. Take your medications as  prescribed  7. Get enough restful sleep  8. Practice Relaxation  9. Spend time with Supportive People  10. No use of alcohol, illegal drugs or addictive medications other than what is currently prescribed.   11.AA, NA Sponsor are excellent resources for support  12. Explore how  you can utilize spirituality in your recovery    Disposition: Return home.    Facts about COVID19 at www.cdc.gov/COVID19 and www.[place].gov/covid19    Keeping hands clean is one of the most important steps we can take to avoid getting sick and spreading germs to others.  Please wash your hands frequently and lather with soap for at least 20 seconds!    Follow-up Appointment:   You are aware you should make a follow up appointment with your primary care doctor for medical and medication management     Recovery apps for your phone for educational purposes and to locate in person and zoom recovery meetings  Everything AA -  arthur is a great resource  12 Step Toolkit - educational purpose learning about the 12 steps to recovery  [place] Cloud - meeting arthur  AA  - meeting arthur  Meeting guide - meeting arthur  Quick NA meeting - meeting arthur  [employer] has various apps    Patient Navigation Hub  [facility] Navigators work to be your point-of-contact for trustworthy and compassionate care from Inpatient services to [facility] Programmatic Care. We will provide resources and communication to help guide you into programmatic care. Ultimately, our goal is to be the one-stop-shop of communication, coordination, and support for your journey to programmatic care.  Phone: [phone]    Resources:  AA/NA meetings have returned to in-person or a hybrid combination of zoom/in-person therefore please check online to verify*  Need Support Now? If you or someone you know is struggling or in crisis, help is available. Call or text 832 or chat "[employer].org  AA meetings search for them at: https://aa-intergroup.org (worldwide meeting  "listings)  AA meetings for [place] area can be found online at: https://[place].org (click local online meetings listings)  NA meetings for [place] area can be found online at: https://www.nam[place].org  (click find a meeting)  AA and NA Sponsors are excellent resources for support and you can find one at any support group meeting.   Alcoholics Anonymous (https://aa.org/): for information 24 hours/day  AA Intergroup service office in [place] (http://www.aastpaul.org/) [phone]  AA Intergroup service office in [facility]: [phone]. (http://www.a[place].org/)  Narcotics Anonymous (www.nam[place].org) (989) 205-1463  https://aafairviewriverside.org/meetings  SMART Recovery - self management for addiction recovery:  www.Carnivalrecovery.org  Pathways ~ A Health Crisis Resource & Support Center:  661.264.4889.  https://prescribetoprevent.org/patient-education/videos/  http://www.harmreduction.org  Crisis Text Line  Text 548757  You will be connected with a trained live crisis counselor to provide support. Por espanol, texto  LENNY a 851070 o texto a 442-AYUDAME en WhatsApp  National Hope Line  1.800.SUICIDE [6192246]  [facility] [phone]  Support Group:  AA/NA and Sponsor/support.  Fast Tracker  Linking people to mental health and substance use disorder resources  [employer].org   [place] Mental Health Warm Line  Peer to peer support  Monday thru Saturday, 12 pm to 10 pm  427.548.2261 or 2.659.842.6836  Text \"Support\" to 72517  National Stanton on Mental Illness (JEANNETTE)  163.976.2796 or 1.888.JEANNETTE.HELPS  Alcoholics Anonymous (www.alcoholics-anonymous.org): Check your phone book for your local chapter.  Suicide Awareness Voices of Education (SAVE) (www.save.org): 155-565-BOMI (8572)  National Suicide Prevention Line (www.mentalhealthmn.org): 662-145-ASJJ (3083)  Mental Health Consumer/Survivor Network of [place] (www.mhcsn.net): 533.293.1876 or 319-675-0321  Mental Health " Association of [place] (www.mentalhealth.org): 105.527.5654 or 767-197-5039   Substance Abuse and Mental Health Services (www.samhsa.gov)  [place] Opioid Prevention Coalition: www.opioidcoalition.org    [place] Recovery Connection (MRC)  [facility] connects people seeking recovery to resources that help foster and sustain long-term recovery.  Whether you are seeking resources for treatment, transportation, housing, job training, education, health care or other pathways to recovery, [facility] is a great place to start.  222.401.2011.  www.[place]arecovery.org    Great Pod casts for nutrition and wellness  Listen on Apple Podcasts  Dishing Up Nutrition   [employer]   Nutrition       Understand the connection between what you eat and how you feel. Hosted by licensed nutritionists and dietitians from [employer] we share practical, real-life solutions for healthier living through nutrition.     General Medication Instructions:   See your medication sheet(s) for instructions.   Take all medications as prescribed.  Make no changes unless your primary care provider suggests them.   Go to all your primary care provider visits.  Be sure to have all your required lab tests. This way, your medicines can be refilled on time.  Do not use any forms of alcohol.    Please Note: If you have any questions at anytime after you discharged please call [facility] detox unit 3A at [phone].    Here are a list of additional numbers you can call if you are wanting to resume services through [facility]:  [facility] Assessment Intake: [phone]  [facility] Adult [facility] Intensive Outpatient  call: [phone]  [facility] Admissions [phone]    Recovery Clinic call: [phone]  [facility]: [phone]  Medical Records call: [phone]  Billing Department call: [phone]    Please remember to take all of your behavioral discharge  planning and lab paperwork to any follow up appointments, it contains your lab results, diagnosis, medication list and discharge recommendations.      THANK YOU FOR CHOOSING [facility]

## 2025-07-04 NOTE — PROGRESS NOTES
Brief Hospital Medicine Note:     Medicine team contacted by RN that patient had bloody stool x 1 overnight. No further episodes and currently without concerns related to this. Nursing notes, vitals, and labs reviewed. Recheck Hgb ordered.     BP (!) 157/89   Pulse 65   Temp 97.5  F (36.4  C) (Oral)   Resp 16   SpO2 97%     Anemia  Hematochezia episode, resolved   Hgb down 15.6->13.1. VSS, no hypotension. No further episodes of hematochezia. Possible initial Hgb on arrival to [facility] slightly heme concentrated in the setting of dehydration and alcohol use. Additionally patient did receive fluid bolus in the ED therefore possible dilutional component on recheck. Will recheck Hgb in AM to monitor trend. Will also monitor for further bloody stools.   -Please reach out to Medicine team if any further concerns for hematochezia, hypotension or melena   -Hgb ordered for AM    Medicine will follow up on AM Hgb     Jazmine Robbins PA-C  Hospitalist Service  Contact information available via [facility] Paging/Directory

## 2025-07-04 NOTE — PLAN OF CARE
Patient out in [facility] area reading a book as writer entered unit to start group. Politely declined group stating he ws enjoying reading right now and did not wish to join. Shares having read other books by this author and really enjoying them and eager to get further into this book.   Writer left 4th of July theme unit safe activities in the [facility] for patients to work on as they would like. Patient thanked writer for their time.

## 2025-07-04 NOTE — PLAN OF CARE
"Pt continues in detox for alcohol. Also reported bloody stool and was seen by internal medicine.     Pt denied all withdrawal symptoms or any more bloody stools but reports feeling depressed \" just being on this earth.\"   When asked about suicidal ideation, pt denied, but agreed to let staff know of any urge to harm himself. Said he started zoloft today, hoping it helps.   He is visible in the milieu but reserved, no peer interaction. RN spent 1:1 time with patient establishing rapport and  providing emotional support which verbalized appreciating.     MSSA : 3 and 2. No valium given, last valium was on the night shift at 0032.  Pt will have repeat Hgb in AM.   B/P: 166/82, T: 98.2, P: 66, R: 16      Goal Outcome Evaluation:            "

## 2025-07-04 NOTE — PLAN OF CARE
Goal Outcome Evaluation:    Per the start of the shift pt was sleeping and slept most of the shift. He ate 100% of breakfast,lunch and had adequate fluid intake. Pt endorses depression but denies SI,HI and SIB. He took scheduled medication and denies SE. Pt presents with a blunted affect and depressed mood. No major behavioral or physically concerns. Denies pain. Staff will continue to monitor. No episode of blood in the stool this shift.     BP (!) 155/90 (BP Location: Left arm)   Pulse 62   Temp 97.4  F (36.3  C) (Oral)   Resp 16   SpO2 97%        Last 24H PRN:     diazepam (VALIUM) tablet 5-20 mg, 10 mg at 07/04/25 0032     MSSA at 8-2  MSSA at 12-1

## 2025-07-04 NOTE — H&P
"PSYCHIATRY   HISTORY AND PHYSICAL     DATE OF SERVICE   7/4/2025         CHIEF COMPLAINT   \" SI and alcohol detox.\"       HISTORY OF PRESENT ILLNESS   This is a 49 year old male with history of alcohol use disorder, depression, and anxiety presents to [facility] ED on 17 2025 for evaluation of suicidal ideation and alcohol intoxication/withdrawal.  Patient reported he has been drinking a half to a liter of vodka per day with last drink just prior to admission arriving at the hospital.  Patient did endorse SI with thoughts to harm himself without any specific plan.Patient was evaluated by provider in the ED and determined to be medically stable.  Patient subsequently admitted voluntarily to inpatient detox unit 3A with attending Dr. Pang for further psychiatric stabilization.  Upon admission, patient placed on status 15 monitoring.  Patient also placed on precautions: Seizure precautions, suicide precautions, withdrawal precautions.  Patient also placed on MSSA protocol with Valium to monitor and treat EtOH withdrawal symptoms.    Direct care provided by: LARRY Barakat CNP    Upon examination, patient reports that he is admitted to the hospital for treatment of suicidal ideation and alcohol withdrawal.  Patient reports that he has been experiencing depression for approximately 30 years.  Patient endorses depression symptoms including: Severe depressed mood, low motivation, fatigue, difficulty concentrating, anhedonia, helplessness and hopelessness.  Patient reports experiencing anxiety for approximately the past 30 years.  Patient denies any SI or HI currently.  Patient is able to contract for safety.  Patient denies any current or history of SIB.  Patient endorses anxiety symptoms including: Mind racing, heart racing, and feeling shortness of breath.  Patient also reports in the past he has experienced panic attacks and that they are unpredictable.  Patient denies any auditory or " "visual hallucinations.  Patient reports his appetite is stable.  Patient reports alcohol use described as, \"1 L of vodka per day for the past few days.  Patient reports he will have periods of sobriety and then relapsed due to worsening depression symptoms.  Patient currently denies any EtOH withdrawal symptoms and none observed.  Patient denies any history of EtOH withdrawal related seizures or DTs.  Patient reports that he is open to psychiatric medication management to target mood disorder symptoms and anxiety symptoms.  Patient also reports that he is not interested in any type of outpatient residential treatment program.    Per ED provider documentation on 7/2/2025:    Chief Complaint   Patient presents with    Suicidal         FINAL IMPRESSION  1. Alcoholic intoxication without complication    2. Suicidal ideation          MEDICAL DECISION MAKING   Everette Gomez is a 49 year old male who presents via EMS  with police escort for evaluation of suicidal ideation and alcohol intoxication/withdrawal.  Patient reports that he has been struggling with his mental health and alcohol use.  He reports that he has been drinking half to 1 L of vodka per day with last drink being just prior to arrival.  He has a history of alcohol withdrawal and reports that in the past, he had to be put into a \"medically induced coma\" to manage this.  He denies history of seizures.  He does feel like he might be experiencing some symptoms of withdrawal now and describes abdominal discomfort and shakiness.  Second, patient endorses having ongoing thoughts of harming himself without a specific plan, though has had thoughts of jumping off a bridge, jumping off a building, and slitting his wrists.  He does not have any report of hallucinations or homicidal ideation.  He reports that he has been taking medications for his mental health, though when asked to clarify, he states that he actually has been drinking alcohol to help with his mood " rather than taking medications.  He denies other drug use.     I considered a broad differential including but not limited to alcohol or other substance intoxication/withdrawal, ROJELIO, gastritis, gastroenteritis, pancreatitis, hepatobiliary disease. Discussed workup and management with patient. We agreed on plan for labs, PO anxiolytic. Will also have patient meet with DEC team to obtain additional information and collateral given report of SI with plans. Patient currently voluntary but I do believe would meet criteria for a hold if he attempts to leave.      ED Course as of 07/03/25 0439   Wed Jul 02, 2025   2302 Ethanol Level Blood(!): 0.28  Elevated and consistent with patient report of having consumed alcohol just prior to arrival.  With this, I have lower suspicion for alcohol withdrawal.   2302 Comprehensive metabolic panel(!)  CMP with mild elevation of anion gap at 18 but otherwise unremarkable without evidence of kidney injury, electrolyte derangement, or hepatobiliary disease.   2302 CBC (+ platelets, no diff)  CBC reassuring. No evidence of leukocytosis to suggest systemic infectious/inflammatory process. No acute anemia. PLTs wnl.   2303 Magnesium: 2.2  Within normal limits, reassuring.      Workup was notable for the above. I rechecked the patient multiple times and reviewed results.  Patient had improvement in anxiety and symptoms after a dose of Valium and did not display any obvious symptoms of withdrawal.       Patient was evaluated by Radha with DEC team who obtained additional information and collateral. Please see that note for details. We discussed history, presenting complaints/symptoms, and options for management.  Patient would be a good candidate for MICD treatment at [facility] and he was comfortable with this recommendation.  I spoke with staff at [facility] who will work on arranging bed placement.  Patient was signed out to night ED provider pending transfer to [facility] for admission.   "He is currently here on a voluntary basis and I do believe would meet criteria for hold if he attempts to leave.    HPI:     Use of : N/A       Everette Gomez is a 49 year old male who presents with depression.      When asked why he is here, the patient states \"I'm a loser\" and says \"maybe I came by mistake\". He has a flat affect. He endorses depression stating \"I don't feel like I wanna live\" and when asked how we could help, he calmly says \"euthanization would be great\". He does not have a plan and elaborates that he is concerned about the trauma that others would have finding him if he killed himself. He says \"anything you do harms other people\" and says that jumping off of a bridge or tall building would mean that someone else would have to find his \"splat\". He also discusses how other methods of self harm are \"too slow\" and that he would \"feel bad for the mary finding me sitting in a pool of blood\" if he cut his wrists.      The patient has not been taking prescribed medications. Instead, he says that the medicine he takes for mental health is alcohol. He admits that he has \"a problem with the alcohol and mental health stuff\". He drinks 0.5-1L of vodka each day. His last drink was just prior to PD arrival earlier today. Right now he says he feels like he is going withdrawal. His symptoms include RUQ abdominal pain, \"shakes\", and \"it feels like every muscle in my body wants to flex\". He has never had an alcohol withdrawal seizure. He reports a history of a \"medically induced coma for withdrawal\" in the past at [facility]. He denies any other complaints at this time.         Per PD, the patient lives [facility] a [facility] and was isolating so someone called PD for a wellness check. He reported recent binge drinking (~1L today) with most recent drink 1 hour prior to arrival. Alert, oriented, and cooperative with PD. In triage he endorsed suicidal ideation but no plan.        Per [facility] assessment documentation on " "7/3/2025:    Referral Data and Chief Complaint  Everette Gomez presents to the ED via EMS, via police. Patient is presenting to the ED for the following concerns: Depression, Worsening psychosocial stress, Substance use, Suicidal ideation. Factors that make the mental health crisis life threatening or complex are: Living in [facility], isn't \"supposed\" to drinking; drinking 1L ETOH for at maybe 2 weeks..   History of the Crisis   Patient Everette Gomez \"Ruddy,\"  is a 49 year old male who presents via EMS  with police escort for evaluation of suicidal ideation and alcohol intoxication/withdrawal.  Patient reports that he has been struggling with his mental health and alcohol use.  He reports that he has been drinking half to 1 L of vodka per day with last drink being just prior to arrival.  He has a history of alcohol withdrawal and reports that in the past, he had to be put into a \"medically induced coma\" to manage this. He does feel like he might be experiencing some symptoms of withdrawal now and describes abdominal discomfort and shakiness.       Ruddy endorses ongoing thoughts of harming himself without a specific plan, though has had thoughts of jumping off a bridge, jumping off a building, and slitting his wrists.  He does not have any report of hallucinations or homicidal ideation.  He is not taking medications but used to take Sertraline, 200 mg and believes it helped alleviate depression.       He says he went to CD treatment in past but was angry and unhappy sober.  He attended AA and NA for awhile  He prefers NA program and is not opposed to attending again.  He is isolated.   He wants to feel and function better but is having a difficult time with the depression.  He is concerned about withdrawal and drinking again to avoid those symptoms.       He is willing to address MH and CD issues.  He is concerned about losing his VA housing at present but says he is open to going to detox with a  " component and addressing ETOH use there. He notes he has shut himself off from friends and family.  Ruddy was civilly committed in 2023 with [facility] for MI/CD but this was closed.    Clinical Substantiation:  After therapeutic assessment, intervention and aftercare planning by ED care team and [facility] and in consultation with attending provider, the patient's circumstances and mental state were appropriate for outpatient management.  Ruddy is concerned about losing his current VA housing but also says he wants to address both his drinking and depression.  He is not actively suicidal but states he has lost a lot of hope that things can get better and states he has some apathy.  He is drinking heavily daily and the depression is untreated.  He has had withdrawal symptoms in past.  He is open to going to a detox with a mental health component.  He says he was on Sertraline in past and is open to getting back on a medication that can address depression.  He is open to returning to  and trying to get back into the stream of life that includes supportive others.     Goals for crisis stabilization:  concerns for withdrawal symptoms; patient safety, manage symptoms     Next steps for Care Team:  Patient Ruddy appears most appropriate for a detox program with  component like [facility] .  He is concerned about losing his VA housing but has contacted his VA counselor.      Per internal medicine provider consulted at time of admission for medical comanagement:    [facility]  Consult Note - Hospitalist Service  Date of Admission:  7/3/2025  Consult Requested by: Dr. Pang  Reason for Consult: detox     Assessment & Plan  Everette Gomez is a 49 year old male admitted on 7/3/2025. He has a past medical history of alcohol use disorder, HLD, MDD. Presented in the setting of alcohol withdrawal and suicidal ideation. Was admitted to  for detox. Internal medicine consulted for  "medical comanagement.   ______________________     # Alcohol withdrawal, hx of alcohol use disorder   # MDD, suicidal ideation   # Hypertension in the setting of withdrawal   Presented via EMS  with police escort for evaluation of suicidal ideation and alcohol intoxication/withdrawal. MSSA 4 this shift.  Denied hx of withdrawal seizures. No hx of Dts.  Blood EtOH on arrival was 0.28. Liver enzymes within normal limits. Patient reports that he has been struggling with his mental health and alcohol use. He reports that he has been drinking half to 1 L of vodka per day with last drink being just prior to arrival to the [facility] 07/02. He has a history of alcohol withdrawal and reports that in the past, he had to be put into a \"medically induced coma\" to manage this. On arrival, did have suicidal ideation with thoughts of jumping off a bridge, jumping off a building, and slitting his wrists. UDS positive for benzos and cocaine.   - Continue MSSA with valium   - Folvite, multi-vites, thiamine supplementation   - PTA hydroxyzine PRN, sertraline daily, and trazodone at bedtime PRN per Psychiatry   - Further management per Psychiatry   - Suicide precautions   - PRN clonidine added for hypertension      # Elevated blood pressure  Patient denies personal history of high blood pressure.  Denies headache, vision changes, chest pain, dyspnea.  Suspect elevated blood pressure in the setting of acute withdrawal.  - As needed clonidine 0.1 mg twice daily as needed for elevated blood pressure  - Please reach out to Medicine team if patient's blood pressure is persistently elevated >180 / >105 and patient is out of withdrawal or if patient develops symptoms related to high blood pressure        # Elevated anion gap   With acidosis. Suspect in the setting of above. No need for recheck at this time.   - Encourage alcohol cessation, PO intake      # Hyperlipidemia  Lipid screen in 2023 demonstrated cholesterol 214, triglycerides 211.   - " "Not currently on statin  - Follow up with PCP for repeat labs, discussion on ways to lower lipids including alcohol cessation  (referral place)     # Nicotine use disorder: declined NRT, no cravings      Resolved:   # RUQ abdominal discomfort, resolved   Reported as abdominal cramping with loose stools, which has now resolved. LFTs within normal limits and resolution of abdominal pain.   -Please reach out to Medicine team if abdominal pain returns of patient develops nausea, vomiting      Medicine team will sign off at this time. Thank you for the consult. Please reach out to Medicine team if further questions or concerns arise.            The patient's care was discussed with the Bedside Nurse, Patient, and Primary team.        CHEMICAL DEPENDENCY HISTORY   History   Drug Use Not on file   Patient reports alcohol use described as, \"a liter of vodka per day for the past few days.\"    Social History    Substance and Sexual Activity      Alcohol use: Not on file      History   Smoking Status    Not on file   Smokeless Tobacco    Not on file       Treatment: Patient reports he has completed substance use disorder treatment programs multiple times in the past.  Detox: Patient reports he has completed inpatient detox in the past for management of alcohol withdrawal  Legal: None reported       PAST PSYCHIATRIC HISTORY   Psychiatrist: Unknown  Therapist: None reported  Case Management: None reported  Hospitalizations: None  History of Commitment: Yes, per chart review patient has a history of commitment and had the county  Past Medications: Sertraline, hydroxyzine, trazodone  ECT: None  Suicide Attempts/Gun Access: None/does not endorse gun access  Community Supports: His stable housing.       PAST MEDICAL HISTORY   No past medical history on file.    No past surgical history on file.    Primary Care Provider: No Ref-Primary, Physician  Medications:   Current Facility-Administered Medications   Medication Dose Route " Frequency Provider Last Rate Last Admin    folic acid (FOLVITE) tablet 1 mg  1 mg Oral Daily Rajendra Pang MD   1 mg at 07/03/25 1449    multivitamin w/minerals (THERA-VIT-M) tablet 1 tablet  1 tablet Oral Daily Rajendra Pang MD   1 tablet at 07/03/25 1449    thiamine (B-1) tablet 100 mg  100 mg Oral Daily Rajendra Pang MD   100 mg at 07/03/25 1449     Medications as needed:   Current Facility-Administered Medications   Medication Dose Route Frequency Provider Last Rate Last Admin    acetaminophen (TYLENOL) tablet 650 mg  650 mg Oral Q4H PRN Rajendra Pang MD   650 mg at 07/03/25 1449    alum & mag hydroxide-simethicone (MAALOX) suspension 30 mL  30 mL Oral Q4H PRN Rajendra Pang MD        cloNIDine (CATAPRES) tablet 0.1 mg  0.1 mg Oral BID PRN Jose D Carl PA-C        diazepam (VALIUM) tablet 5-20 mg  5-20 mg Oral Q30 Min PRN Rajendra Pang MD   10 mg at 07/04/25 0032    hydrOXYzine HCl (ATARAX) tablet 25 mg  25 mg Oral Q4H PRN Rajendra Pang MD   25 mg at 07/03/25 1449    loperamide (IMODIUM) capsule 2 mg  2 mg Oral 4x Daily PRN Rajendra Pang MD        OLANZapine (zyPREXA) tablet 10 mg  10 mg Oral TID PRN Rajendra Pang MD        Or    OLANZapine (zyPREXA) injection 10 mg  10 mg Intramuscular TID PRN Rajendra Pang MD        ondansetron (ZOFRAN ODT) ODT tab 4 mg  4 mg Oral Q6H PRN Rajendra Pang MD        senna-docusate (SENOKOT-S/PERICOLACE) 8.6-50 MG per tablet 1 tablet  1 tablet Oral BID PRN Rajendra Pang MD        traZODone (DESYREL) tablet 50 mg  50 mg Oral At Bedtime PRN Rajendra Pang MD           ALLERGIES: Patient has no known allergies.       MEDICATIONS   Current Facility-Administered Medications   Medication Dose Route Frequency Provider Last Rate Last Admin    acetaminophen (TYLENOL) tablet 650 mg  650 mg Oral Q4H PRN Rajendra Pang MD   650 mg at 07/03/25 1449    alum & mag hydroxide-simethicone  (MAALOX) suspension 30 mL  30 mL Oral Q4H PRN Rajendra Pang MD        cloNIDine (CATAPRES) tablet 0.1 mg  0.1 mg Oral BID PRN Jose D Carl PA-C        diazepam (VALIUM) tablet 5-20 mg  5-20 mg Oral Q30 Min PRN Rajendra Pang MD   10 mg at 07/04/25 0032    folic acid (FOLVITE) tablet 1 mg  1 mg Oral Daily Rajendra Pang MD   1 mg at 07/03/25 1449    hydrOXYzine HCl (ATARAX) tablet 25 mg  25 mg Oral Q4H PRN Rajendra Pang MD   25 mg at 07/03/25 1449    loperamide (IMODIUM) capsule 2 mg  2 mg Oral 4x Daily PRN Rajendra Pang MD        multivitamin w/minerals (THERA-VIT-M) tablet 1 tablet  1 tablet Oral Daily Rajendra Pang MD   1 tablet at 07/03/25 1449    OLANZapine (zyPREXA) tablet 10 mg  10 mg Oral TID PRN Rajendra Pang MD        Or    OLANZapine (zyPREXA) injection 10 mg  10 mg Intramuscular TID PRN Rajendra Pang MD        ondansetron (ZOFRAN ODT) ODT tab 4 mg  4 mg Oral Q6H PRN Rajendra Pang MD        senna-docusate (SENOKOT-S/PERICOLACE) 8.6-50 MG per tablet 1 tablet  1 tablet Oral BID PRN Rajendra Pang MD        thiamine (B-1) tablet 100 mg  100 mg Oral Daily Rajendra Pang MD   100 mg at 07/03/25 1449    traZODone (DESYREL) tablet 50 mg  50 mg Oral At Bedtime PRN Rajendra Pang MD            Medication adherence issues: MS Med Adherence Y/N: Yes, Patient reports a history of noncompliance with psychiatric medications in the community  Medication side effects: MEDICATION SIDE EFFECTS: no side effects reported  Benefit: Yes / No: Yes       ROS   Pertinent items are noted in HPI.       FAMILY HISTORY   No family history on file.     Psychiatric: Unknown  Chemical: Unknown  Suicide: Unknown       SOCIAL HISTORY   Social History     Socioeconomic History    Marital status: Single     Spouse name: Not on file    Number of children: Not on file    Years of education: Not on file    Highest education level: Not on file    Occupational History    Not on file   Tobacco Use    Smoking status: Not on file    Smokeless tobacco: Not on file   Substance and Sexual Activity    Alcohol use: Not on file    Drug use: Not on file    Sexual activity: Not on file   Other Topics Concern    Not on file   Social History Narrative    Not on file     Social Drivers of Health     Financial Resource Strain: Low Risk  (7/3/2025)    Financial Resource Strain     Within the past 12 months, have you or your family members you live with been unable to get utilities (heat, electricity) when it was really needed?: No   Food Insecurity: Low Risk  (7/3/2025)    Food Insecurity     Within the past 12 months, did you worry that your food would run out before you got money to buy more?: No     Within the past 12 months, did the food you bought just not last and you didn t have money to get more?: No   Transportation Needs: Low Risk  (7/3/2025)    Transportation Needs     Within the past 12 months, has lack of transportation kept you from medical appointments, getting your medicines, non-medical meetings or appointments, work, or from getting things that you need?: No   Physical Activity: Not on file   Stress: Not on file   Social Connections: Not on file   Interpersonal Safety: Low Risk  (7/3/2025)    Interpersonal Safety     Do you feel physically and emotionally safe where you currently live?: Yes     Within the past 12 months, have you been hit, slapped, kicked or otherwise physically hurt by someone?: No     Within the past 12 months, have you been humiliated or emotionally abused in other ways by your partner or ex-partner?: No   Housing Stability: High Risk (7/3/2025)    Housing Stability     Do you have housing? : No     Are you worried about losing your housing?: No       Born and Raised: [place]  Occupation: Unemployed  Marital Status:   Children: Per chart review, patient has reported having children  Legal: None reported  Living Situation:  "Living arrangements - the patient reports he has been living in transitional housing  ASSETS/STRENGTHS: Help seeking       MENTAL STATUS EXAM   Appearance:  Disheveled  Mood:  {Mood: Depressed   Affect: blunted and guarded  was congruent to speech  Suicidal Ideation: PRESENT / ABSENT: absent   Homicidal Ideation: PRESENT / ABSENT: absent   Thought process: Slightly circumstantial and tangential however logical  Thought content: denies suicidal and violent ideation.   Fund of Knowledge: Average  Attention/Concentration: Normal  Language ability:  Intact  Memory: Appears intact, not formally assessed  Insight: Limited  Judgement: fair  Orientation: Person:  yes  Place:  yes  Time:  yes  Situation:  yes  Psychomotor Behavior: normal or unremarkable    Muscle Strength and Tone: MuscleStrength: Normal  Gait and Station: Normal         PHYSICAL EXAM   Vitals: BP (!) 155/90 (BP Location: Left arm)   Pulse 62   Temp 97.4  F (36.3  C) (Oral)   Resp 16   SpO2 97%     Physical exam as per Betty Suero MD. Dated 7/2/2025:    Vitals: BP (!) 161/105   Pulse 92   Temp 97.5  F (36.4  C) (Oral)   Resp 20   Wt 108.6 kg (239 lb 8 oz)   SpO2 97%   BMI 36.42 kg/m    General: Awake, alert, interactive. Standing up at bedside. Pacing. Appears anxious and paranoid.   HENT: Atraumatic. MMM.   Neck: Full AROM.  Cardiovascular: Regular rate.  Respiratory/Chest: Normal work of breathing.   Abdomen: Non-distended.   Musculoskeletal: Normal appearing extremities without obvious deformities or signs of trauma.   Skin: Normal color. No rash or diaphoresis.  Neurologic: Alert, oriented. Speech clear. CN's grossly intact. Moving all extremities spontaneously.   Psychiatric: Patient reports mood as \"depressed.\" Affect appears flat,  congruent with mood. Eye contact fair. Does not appear to be responding to internal stimuli but appears paranoid. Thought process and content organized, no delusions. Speech normal, not tangential or pressured. " Endorses passive suicidal ideation with various plan. Denies homicidal ideation, visual hallucinations, or auditory hallucinations.             LABS   personally reviewed.      Latest Reference Range & Units 07/02/25 21:55 07/03/25 10:21 07/03/25 11:01 07/04/25 07:17   Sodium 135 - 145 mmol/L 140      Potassium 3.4 - 5.3 mmol/L 4.1      Chloride 98 - 107 mmol/L 100      Carbon Dioxide (CO2) 22 - 29 mmol/L 22      Urea Nitrogen 6.0 - 20.0 mg/dL 12.1      Creatinine 0.67 - 1.17 mg/dL 0.85      GFR Estimate >60 mL/min/1.73m2 >90      Calcium 8.8 - 10.4 mg/dL 9.1      Anion Gap 7 - 15 mmol/L 18 (H)      Magnesium 1.7 - 2.3 mg/dL 2.2      Phosphorus 2.5 - 4.5 mg/dL 3.6      Albumin 3.5 - 5.2 g/dL 4.8      Protein Total 6.4 - 8.3 g/dL 7.5      Alkaline Phosphatase 40 - 150 U/L 86      ALT 0 - 70 U/L 54      AST 0 - 45 U/L 45      Bilirubin Total <=1.2 mg/dL 0.4      Cholesterol <200 mg/dL    248 (H)   Glucose 70 - 99 mg/dL 104 (H)      HDL Cholesterol >=40 mg/dL    56   Estimated Average Glucose <117 mg/dL    114   Hemoglobin A1C <5.7 %    5.6   LDL Cholesterol Calculated     See Comment   Non HDL Cholesterol <130 mg/dL    192 (H)   Triglycerides <150 mg/dL    492 (H)   TSH 0.30 - 4.20 uIU/mL    3.04   GLUCOSE BY METER POCT 70 - 99 mg/dL   112 (H)    WBC 4.0 - 11.0 10e3/uL 5.7      Hemoglobin 13.3 - 17.7 g/dL 15.6   13.1 (L)   Hematocrit 40.0 - 53.0 % 43.5      Platelet Count 150 - 450 10e3/uL 191      RBC Count 4.40 - 5.90 10e6/uL 4.83      MCV 78 - 100 fL 90   93   MCH 26.5 - 33.0 pg 32.3      MCHC 31.5 - 36.5 g/dL 35.9      RDW 10.0 - 15.0 % 12.9      Amphetamine Qual Urine Screen Negative   Screen Negative     Fentanyl Qual Urine Screen Negative   Screen Negative     Cocaine Urine Screen Negative   Screen Positive !     Benzodiazepine Urine Screen Negative   Screen Positive !     Opiates Qualitative Urine Screen Negative   Screen Negative     PCP Urine Screen Negative   Screen Negative     Cannabinoids Qual Urine  "Screen Negative   Screen Negative     Barbiturates Qual Urine Screen Negative   Screen Negative     Ethanol Level Blood <=0.01 g/dL 0.28 (H)      (H): Data is abnormally high  (L): Data is abnormally low  !: Data is abnormal    No results found for: \"PHENYTOIN\", \"PHENOBARB\", \"VALPROATE\", \"CBMZ\"       ASSESSMENT   This is a 49 year old male with history of alcohol use disorder, depression, and anxiety presents to [facility] ED on 17 2025 for evaluation of suicidal ideation and alcohol intoxication/withdrawal.  Patient reported he has been drinking a half to a liter of vodka per day with last drink just prior to admission arriving at the hospital.  Patient did endorse SI with thoughts to harm himself without any specific plan.Patient was evaluated by provider in the ED and determined to be medically stable.  Patient subsequently admitted voluntarily to inpatient detox unit 3A with attending Dr. Pang for further psychiatric stabilization.  Upon admission, patient placed on status 15 monitoring.  Patient also placed on precautions: Seizure precautions, suicide precautions, withdrawal precautions.  Patient also placed on MSSA protocol with Valium to monitor and treat EtOH withdrawal symptoms.    At time of admission, patient agreeable to restarting sertraline which he reports in the past was helpful for depression and anxiety symptoms however has been off this medication for months.  Patient also reports he would be open to starting MAT once EtOH withdrawal symptoms are stable.  Patient reports he is not interested in any type of substance use disorder treatment program after discharge.       DIAGNOSIS   Principal Problem:    Alcohol withdrawal  Alcohol use disorder, severe, dependence  Major depressive disorder, severe, recurrent  Generalized anxiety disorder, with panic attacks  Nicotine use disorder, severe    Active Problem List:  Patient Active Problem List   Diagnosis    Alcohol use disorder, " severe, dependence (H)    Recurrent major depression    Suicidal ideation       Clinically Significant Risk Factors Present on Admission                                   # Financial/Environmental Concerns:                     PLAN   1. Education given regarding diagnostic and treatment options with risks, benefits and alternatives and adequate verbalization of understanding.  2. Admitted on 7/3/2025.  MSSA protocol with Valium ordered to monitor and treat EtOH withdrawal symptoms.  Precautions placed: Seizure precautions, suicide precautions, withdrawal precautions.  3. Medications: 7/4/2025: PTA medications reviewed.  Restart sertraline 25 mg p.o. daily to target mood disorder symptoms and anxiety symptoms  4. Medications:  Hospital  Continue folic acid 1 mg p.o. daily, indication: Vitamin supplement for alcohol use disorder  Continue multivitamin 1 tablet p.o. daily, indication: Vitamin supplement for alcohol use disorder  Continue thiamine 100 mg p.o. daily, indication: Vitamin supplement for alcohol use disorder  PRN Valium 5-20 mg p.o. every 30 minutes, dose according to patient's MSSA score  PRN hydroxyzine 25-50 mg p.o. every 4 hours as needed for anxiety symptoms  PRN trazodone  mg p.o. at bedtime as needed for sleep promotion  5. Consultations:  Internal medicine team consulted at time of admission for medical comanagement  6. Structure and Supervision  Unit 3A.  7.   is following in regards to collecting and reviewing collateral information, referrals and disposition planning.  Legal: Voluntary  Referrals: TBD  Care Coordination: Per unit CTC  Placement: TBD  Anticipated Discharge: 5-7 days     Further treatment programming to be determined throughout the hospital course.        Risk Assessment: [facility] RISK ASSESSMENT: Patient able to contract for safety and Patient on precautions    This note was created with help of Dragon dictation system. Grammatical / typing errors are not  intentional.    LARRY Barakat CNP       CERTIFICATION   Initial Certification I certify that the inpatient psychiatric facility admission was medically necessary for treatment which could   reasonably be expected to improve the patient s condition.                                       I estimate 5-7 days of hospitalization is necessary for proper treatment of the patient. My plans for post-hospital care for this patient are TBD.                                       LARRY Barakat CNP     -     7/4/2025     -     12:00 PM

## 2025-07-04 NOTE — PLAN OF CARE
Behavioral Team Discussion: (7/4/2025)    Continued Stay Criteria/Rationale: Patient admitted for Suicidal Ideations and Chemical Use Issues.  Plan: The following services will be provided to the patient; psychiatric assessment, medication management, therapeutic milieu, individual and group support, and skills groups.   Participants: 3A Provider: Rajendra Pang MD; 3A RN: Beth Oconnell RN; 3A LADC: RADHA Elizabeth.  Summary/Recommendation: Providers will assess today for treatment recommendations, discharge planning, and aftercare plans. LADC will meet with pt for discharge planning.   Medical/Physical: No new medical concerns reported.  Precautions:   Behavioral Orders   Procedures    Code 1 - Restrict to Unit    Routine Programming     As clinically indicated    Seizure precautions    Status 15     Every 15 minutes.    Suicide precautions: Suicide Risk: LOW; Clinical rationale to override score: lack of access to a plan for self-harm     Patients on Suicide Precautions should have a Combination Diet ordered that includes a Diet selection(s) AND a Behavioral Tray selection for Safe Tray - with utensils, or Safe Tray - NO utensils       Suicide Risk:   LOW     Clinical rationale to override score::   lack of access to a plan for self-harm    Withdrawal precautions     Rationale for change in precautions or plan: N/A  Progress: Initial.    ASAM Dimension Scale Ratings:  Dimension 1: 3 Client tolerates and maryse with withdrawal discomfort poorly. Client has severe intoxication, such that the client endangers self or others, or intoxication has not abated with less intensive levels of services. Client displays severe signs and symptoms; or risk of severe, but manageable withdrawal; or withdrawal worsening despite detox at less intensive level.  Dimension 2: 1 Client tolerates and maryse with physical discomfort and is able to get the services that the client needs.  Dimension 3: 2 Client has difficulty with  impulse control and lacks coping skills. Client has thoughts of suicide or harm to others without means; however, the thoughts may interfere with participation in some treatment activities. Client has difficulty functioning in significant life areas. Client has moderate symptoms of emotional, behavioral, or cognitive problems. Client is able to participate in most treatment activities.  Dimension 4: 2 Client displays verbal compliance, but lacks consistent behaviors; has low motivation for change; and is passively involved in treatment.  Dimension 5: 4 No awareness of the negative impact of mental health problems or substance abuse. No coping skills to arrest mental health or addiction illnesses, or prevent relapse.  Dimension 6: 3 Client is not engaged in structured, meaningful activity and the client's peers, family, significant other, and living environment are unsupportive, or there is significant criminal justice system involvement.

## 2025-07-05 LAB
HGB BLD-MCNC: 14.3 G/DL (ref 13.3–17.7)
HOLD SPECIMEN: NORMAL
MCV RBC AUTO: 92 FL (ref 78–100)

## 2025-07-05 PROCEDURE — 128N000001 HC R&B CD/MH ADULT

## 2025-07-05 PROCEDURE — 85018 HEMOGLOBIN: CPT | Performed by: PHYSICIAN ASSISTANT

## 2025-07-05 PROCEDURE — 250N000013 HC RX MED GY IP 250 OP 250 PS 637

## 2025-07-05 PROCEDURE — 250N000013 HC RX MED GY IP 250 OP 250 PS 637: Performed by: PSYCHIATRY & NEUROLOGY

## 2025-07-05 PROCEDURE — 97150 GROUP THERAPEUTIC PROCEDURES: CPT | Mod: GO

## 2025-07-05 PROCEDURE — 99231 SBSQ HOSP IP/OBS SF/LOW 25: CPT | Performed by: PHYSICIAN ASSISTANT

## 2025-07-05 PROCEDURE — 36415 COLL VENOUS BLD VENIPUNCTURE: CPT | Performed by: PHYSICIAN ASSISTANT

## 2025-07-05 RX ADMIN — Medication 1 TABLET: at 08:13

## 2025-07-05 RX ADMIN — TRAZODONE HYDROCHLORIDE 50 MG: 50 TABLET ORAL at 22:21

## 2025-07-05 RX ADMIN — THIAMINE HCL TAB 100 MG 100 MG: 100 TAB at 08:13

## 2025-07-05 RX ADMIN — SERTRALINE HYDROCHLORIDE 25 MG: 25 TABLET ORAL at 08:13

## 2025-07-05 RX ADMIN — FOLIC ACID 1 MG: 1 TABLET ORAL at 08:13

## 2025-07-05 ASSESSMENT — ACTIVITIES OF DAILY LIVING (ADL)
ADLS_ACUITY_SCORE: 20
ADLS_ACUITY_SCORE: 20
ORAL_HYGIENE: INDEPENDENT
ADLS_ACUITY_SCORE: 20
ADLS_ACUITY_SCORE: 20
ORAL_HYGIENE: INDEPENDENT
ADLS_ACUITY_SCORE: 20
ADLS_ACUITY_SCORE: 20
LAUNDRY: UNABLE TO COMPLETE
ADLS_ACUITY_SCORE: 20
ADLS_ACUITY_SCORE: 20
HYGIENE/GROOMING: INDEPENDENT
ADLS_ACUITY_SCORE: 20
DRESS: SCRUBS (BEHAVIORAL HEALTH)
ADLS_ACUITY_SCORE: 20
HYGIENE/GROOMING: INDEPENDENT
ADLS_ACUITY_SCORE: 20
ADLS_ACUITY_SCORE: 20
DRESS: INDEPENDENT
ADLS_ACUITY_SCORE: 20
ADLS_ACUITY_SCORE: 20

## 2025-07-05 NOTE — PROGRESS NOTES
Brief Hospital Medicine Note:     Everette Gomez is a 49 year old male admitted on 7/3/2025. He has a past medical history of alcohol use disorder, HLD, MDD. Presented in the setting of alcohol withdrawal and suicidal ideation. Was admitted to  for detox Medicine team following up on Hgb which was ordered after patient stated he had one episode of hematochezia. Nursing notes, vitals, and labs reviewed.     Anemia, resolved: down to 13.1 on 07/04, recheck showed this is now wnl. No further recheck indicated at this time.   -Please reach out to medicine with any concerns for hypotension or recurrence of bloody stools.     Medicine will sign off at this time. Thank you for the consult. If further questions arise, please reach out to Medicine Team.     Jazmine Robbins PA-C  Hospitalist Service  Contact information available via [facility] Paging/Directory

## 2025-07-05 NOTE — PLAN OF CARE
Rehab Group    Start time: 1645  End time: 1720  Patient time total: 35 minutes    attended full group    #4 attended   Group Type: occupational therapy and general health and coping   Group Topic Covered: cognitive activities, coping skills, healthy leisure time, and problem solving   Group Session Detail:OT Cognitive Wellness group:Patient engaged in a leisure activity with a visuospatial and cognitive component in order to promote: problem solving skills, improve attention, emphasize new learning/recall, exercise cognitive skills, and foster healthy leisure exploration and healthy distraction techniques   Patient Response/Contribution:  cooperative with task, socially appropriate, expressed understanding of topic, and actively engaged   Patient Detail:pt somewhat flat, guarded at start of group. Pt shared he reads for his cognitive wellness. Pt was ind with task following initial instructions and demonstration. Pt was able to remain focused for each round of activity. Pt responses were in context and numerous. Pt endorsed positive response to activity.       [id number] OT Group (2 or more in attendance)      Patient Active Problem List   Diagnosis    Alcohol use disorder, severe, dependence (H)    Recurrent major depression    Suicidal ideation

## 2025-07-05 NOTE — PLAN OF CARE
[facility]     Case Management Encounter:   Updated referral tracker    Insurance:   [employer]    Legal Status:   Orders Placed This Encounter      Voluntary     SUDs Assessment Status:   Not needed     ROIs on file:  None     Living Situation:   [facility]     Current Providers, Supports & Collateral:    [place] [facility] [place] [facility] [place] ([facility] Staff    Current Plan/Referral Status:   Detox only    Safety Plan Status:  Has been completed    IP referral tracker complete:   yes    RN updated.    Naila Andujar, LENNY, [facility] - Adult Inpatient Addiction Psychiatry Unit

## 2025-07-05 NOTE — PLAN OF CARE
Problem: Alcohol Withdrawal  Goal: Alcohol Withdrawal Symptom Control  Outcome: Progressing   Goal Outcome Evaluation:    Patient appeared calm and comfortable. Breathing unlabored and effortless. Patient's MSSA scores at this shift are 2 and 3. No prn valium given. 15 minutes checks completed for safety. /80 (BP Location: Left arm, Patient Position: Sitting, Cuff Size: Adult Regular)   Pulse 64   Temp 98.6  F (37  C) (Oral)   Resp 16   SpO2 98%

## 2025-07-05 NOTE — PLAN OF CARE
Problem: Adult Behavioral Health Plan of Care  Goal: Plan of Care Review  Outcome: Progressing  Flowsheets (Taken 7/5/2025 0930)  Patient Agreement with Plan of Care: agrees   Goal Outcome Evaluation:    Plan of Care Reviewed With: patient      Pt alert and visible for meals, fam stable, rates anxiety low, c/o abdominal discomfort, endorsed a good appetite and drank adequately, , MSSA 2/1. Pt endorsing a headache but declines any medical intervention. Encouraged fluid intake and non pharmacological interventions such as heat/cold packs. Pt declined to both    Pt watching TV with peers after meals last valium given at 0300.

## 2025-07-06 VITALS
TEMPERATURE: 97.6 F | RESPIRATION RATE: 16 BRPM | SYSTOLIC BLOOD PRESSURE: 117 MMHG | HEART RATE: 60 BPM | DIASTOLIC BLOOD PRESSURE: 77 MMHG | OXYGEN SATURATION: 98 %

## 2025-07-06 PROCEDURE — 250N000013 HC RX MED GY IP 250 OP 250 PS 637: Performed by: PSYCHIATRY & NEUROLOGY

## 2025-07-06 PROCEDURE — 99238 HOSP IP/OBS DSCHRG MGMT 30/<: CPT | Performed by: PSYCHIATRY & NEUROLOGY

## 2025-07-06 PROCEDURE — 250N000013 HC RX MED GY IP 250 OP 250 PS 637

## 2025-07-06 RX ORDER — HYDROXYZINE HYDROCHLORIDE 50 MG/1
50 TABLET, FILM COATED ORAL EVERY 6 HOURS PRN
Qty: 100 TABLET | Refills: 2 | Status: SHIPPED | OUTPATIENT
Start: 2025-07-06

## 2025-07-06 RX ORDER — TRAZODONE HYDROCHLORIDE 50 MG/1
50 TABLET ORAL
Qty: 30 TABLET | Refills: 2 | Status: SHIPPED | OUTPATIENT
Start: 2025-07-06

## 2025-07-06 RX ORDER — SERTRALINE HYDROCHLORIDE 100 MG/1
200 TABLET, FILM COATED ORAL DAILY
Qty: 60 TABLET | Refills: 2 | Status: SHIPPED | OUTPATIENT
Start: 2025-07-06

## 2025-07-06 RX ADMIN — SERTRALINE HYDROCHLORIDE 25 MG: 25 TABLET ORAL at 08:05

## 2025-07-06 RX ADMIN — Medication 1 TABLET: at 08:05

## 2025-07-06 RX ADMIN — HYDROXYZINE HYDROCHLORIDE 50 MG: 25 TABLET, FILM COATED ORAL at 00:20

## 2025-07-06 RX ADMIN — THIAMINE HCL TAB 100 MG 100 MG: 100 TAB at 08:05

## 2025-07-06 RX ADMIN — FOLIC ACID 1 MG: 1 TABLET ORAL at 08:05

## 2025-07-06 ASSESSMENT — ACTIVITIES OF DAILY LIVING (ADL)
ADLS_ACUITY_SCORE: 20

## 2025-07-06 NOTE — PROGRESS NOTES
Pt.is out of detox for acute alcohol withdrawal symptoms. He has not needed prn valium to manage withdrawal symptoms in 24 hours. He appears stable and denies all targeted withdrawal symptoms. Will continue to monitor.

## 2025-07-06 NOTE — PLAN OF CARE
Problem: Depressive Symptoms  Goal: Depressive Symptoms  Description: Signs and symptoms of listed problems will be absent or manageable.  Outcome: Progressing     Problem: Sleep Disturbance  Goal: Adequate Sleep/Rest  Outcome: Progressing   Goal Outcome Evaluation:       Pt.remained stable through the night after safely detoxifying from alcohol. He said the main focus is on his mental health. Appeared asleep and comfortable. Breath sounds were clear and unlabored. No safety or behavioral concerns observed or reported. Will continue to monitor.

## 2025-07-06 NOTE — PLAN OF CARE
Problem: Adult Behavioral Health Plan of Care  Goal: Adheres to Safety Considerations for Self and Others  Intervention: Develop and Maintain Individualized Safety Plan  Recent Flowsheet Documentation  Taken 7/6/2025 1100 by Tomasa Redmond RN  Safety Measures: safety rounds completed     Problem: Alcohol Withdrawal  Goal: Alcohol Withdrawal Symptom Control  Intervention: Minimize or Manage Alcohol Withdrawal Symptoms  Recent Flowsheet Documentation  Taken 7/6/2025 1100 by Tomasa Redmond, RN  Seizure Precautions: clutter-free environment maintained  Goal: Optimal Neurologic Function  Intervention: Prevent Seizure-Related Injury  Recent Flowsheet Documentation  Taken 7/6/2025 1100 by Tomasa Redmond, RN  Seizure Precautions: clutter-free environment maintained   Goal Outcome Evaluation:

## 2025-07-06 NOTE — DISCHARGE SUMMARY
HOSPITAL COURSE     Patient was admitted due to alcohol dependence.     Patient underwent detox as per Valium detox protocols and tolerated this well.     Patient had no other medical issues during the admission.    Patient was having suicidal thoughts prior to admission and on presentation to the ER he was complaining of depression and suicidal thoughts. He was started on a regimen of Zoloft which he had been taking recently with benefit, but had stopped it 2 months ago. He tolerated this well.    As of day of discharge patient was calm and cooperative with no suicidal or homicidal thoughts and no evidence of withdrawal.            DIAGNOSIS   Principal Problem:    Alcohol use disorder, severe, dependence (H)    Alcohol withdrawal  Alcohol use disorder, severe, dependence  Major depressive disorder, severe, recurrent  Generalized anxiety disorder, with panic attacks  Nicotine use disorder, severe    Active Problem List:  Patient Active Problem List   Diagnosis    Alcohol use disorder, severe, dependence (H)    Recurrent major depression    Suicidal ideation             PLAN   Discharge to home    Encourage AA and/or outpatient CD treatment.    Patient will  pursue outpatient psychiatric services.    Patient will gradually increase Zoloft back to 100 mg a day.    More than 20 minutes spent on this visit including patient interview, coordination of care with staff, reviewing medical record, psychoeducation, providing supportive therapy regarding coping with chronic mental illness, entering orders and preparing documentation for the visit    Gerard Redmond MD           HISTORY OF PRESENT ILLNESS   This is a 49 year old male with history of alcohol use disorder, depression, and anxiety presents to [facility] ED on 17 2025 for evaluation of suicidal ideation and alcohol intoxication/withdrawal.  Patient reported he has been drinking a half to a liter of vodka per day with last drink just prior  "to admission arriving at the hospital.  Patient did endorse SI with thoughts to harm himself without any specific plan.Patient was evaluated by provider in the ED and determined to be medically stable.  Patient subsequently admitted voluntarily to inpatient detox unit 3A with attending Dr. Pang for further psychiatric stabilization.  Upon admission, patient placed on status 15 monitoring.  Patient also placed on precautions: Seizure precautions, suicide precautions, withdrawal precautions.  Patient also placed on MSSA protocol with Valium to monitor and treat EtOH withdrawal symptoms.    Direct care provided by: LARRY Barakat CNP    Upon examination, patient reports that he is admitted to the hospital for treatment of suicidal ideation and alcohol withdrawal.  Patient reports that he has been experiencing depression for approximately 30 years.  Patient endorses depression symptoms including: Severe depressed mood, low motivation, fatigue, difficulty concentrating, anhedonia, helplessness and hopelessness.  Patient reports experiencing anxiety for approximately the past 30 years.  Patient denies any SI or HI currently.  Patient is able to contract for safety.  Patient denies any current or history of SIB.  Patient endorses anxiety symptoms including: Mind racing, heart racing, and feeling shortness of breath.  Patient also reports in the past he has experienced panic attacks and that they are unpredictable.  Patient denies any auditory or visual hallucinations.  Patient reports his appetite is stable.  Patient reports alcohol use described as, \"1 L of vodka per day for the past few days.  Patient reports he will have periods of sobriety and then relapsed due to worsening depression symptoms.  Patient currently denies any EtOH withdrawal symptoms and none observed.  Patient denies any history of EtOH withdrawal related seizures or DTs.  Patient reports that he is open to psychiatric medication management " "to target mood disorder symptoms and anxiety symptoms.  Patient also reports that he is not interested in any type of outpatient residential treatment program.    Per ED provider documentation on 7/2/2025:    Chief Complaint   Patient presents with    Suicidal         FINAL IMPRESSION  1. Alcoholic intoxication without complication    2. Suicidal ideation          MEDICAL DECISION MAKING   Everette Gomez is a 49 year old male who presents via EMS  with police escort for evaluation of suicidal ideation and alcohol intoxication/withdrawal.  Patient reports that he has been struggling with his mental health and alcohol use.  He reports that he has been drinking half to 1 L of vodka per day with last drink being just prior to arrival.  He has a history of alcohol withdrawal and reports that in the past, he had to be put into a \"medically induced coma\" to manage this.  He denies history of seizures.  He does feel like he might be experiencing some symptoms of withdrawal now and describes abdominal discomfort and shakiness.  Second, patient endorses having ongoing thoughts of harming himself without a specific plan, though has had thoughts of jumping off a bridge, jumping off a building, and slitting his wrists.  He does not have any report of hallucinations or homicidal ideation.  He reports that he has been taking medications for his mental health, though when asked to clarify, he states that he actually has been drinking alcohol to help with his mood rather than taking medications.  He denies other drug use.     I considered a broad differential including but not limited to alcohol or other substance intoxication/withdrawal, ROJELIO, gastritis, gastroenteritis, pancreatitis, hepatobiliary disease. Discussed workup and management with patient. We agreed on plan for labs, PO anxiolytic. Will also have patient meet with DEC team to obtain additional information and collateral given report of SI with plans. Patient currently " "voluntary but I do believe would meet criteria for a hold if he attempts to leave.      ED Course as of 07/03/25 0439   Wed Jul 02, 2025 2302 Ethanol Level Blood(!): 0.28  Elevated and consistent with patient report of having consumed alcohol just prior to arrival.  With this, I have lower suspicion for alcohol withdrawal.   2302 Comprehensive metabolic panel(!)  CMP with mild elevation of anion gap at 18 but otherwise unremarkable without evidence of kidney injury, electrolyte derangement, or hepatobiliary disease.   2302 CBC (+ platelets, no diff)  CBC reassuring. No evidence of leukocytosis to suggest systemic infectious/inflammatory process. No acute anemia. PLTs wnl.   2303 Magnesium: 2.2  Within normal limits, reassuring.      Workup was notable for the above. I rechecked the patient multiple times and reviewed results.  Patient had improvement in anxiety and symptoms after a dose of Valium and did not display any obvious symptoms of withdrawal.       Patient was evaluated by Radha with DEC team who obtained additional information and collateral. Please see that note for details. We discussed history, presenting complaints/symptoms, and options for management.  Patient would be a good candidate for MICD treatment at [facility] and he was comfortable with this recommendation.  I spoke with staff at [facility] who will work on arranging bed placement.  Patient was signed out to night ED provider pending transfer to [facility] for admission.  He is currently here on a voluntary basis and I do believe would meet criteria for hold if he attempts to leave.    HPI:     Use of : N/A       Everette Gomez is a 49 year old male who presents with depression.      When asked why he is here, the patient states \"I'm a loser\" and says \"maybe I came by mistake\". He has a flat affect. He endorses depression stating \"I don't feel like I wanna live\" and when asked how we could help, he calmly says \"euthanization " "would be great\". He does not have a plan and elaborates that he is concerned about the trauma that others would have finding him if he killed himself. He says \"anything you do harms other people\" and says that jumping off of a bridge or tall building would mean that someone else would have to find his \"splat\". He also discusses how other methods of self harm are \"too slow\" and that he would \"feel bad for the mary finding me sitting in a pool of blood\" if he cut his wrists.      The patient has not been taking prescribed medications. Instead, he says that the medicine he takes for mental health is alcohol. He admits that he has \"a problem with the alcohol and mental health stuff\". He drinks 0.5-1L of vodka each day. His last drink was just prior to PD arrival earlier today. Right now he says he feels like he is going withdrawal. His symptoms include RUQ abdominal pain, \"shakes\", and \"it feels like every muscle in my body wants to flex\". He has never had an alcohol withdrawal seizure. He reports a history of a \"medically induced coma for withdrawal\" in the past at [facility]. He denies any other complaints at this time.         Per , the patient [facility] a [facility] and was isolating so someone called PD for a wellness check. He reported recent binge drinking (~1L today) with most recent drink 1 hour prior to arrival. Alert, oriented, and cooperative with PD. In triage he endorsed suicidal ideation but no plan.        Per [facility] assessment documentation on 7/3/2025:    Referral Data and Chief Complaint  Everette Gomez presents to the ED via EMS, via police. Patient is presenting to the ED for the following concerns: Depression, Worsening psychosocial stress, Substance use, Suicidal ideation. Factors that make the mental health crisis life threatening or complex are: Living in [facility] housing, isn't \"supposed\" to drinking; drinking 1L ETOH for at maybe 2 weeks..   History of the Crisis   Patient Everette Gomez " "\"Ruddy,\"  is a 49 year old male who presents via EMS  with police escort for evaluation of suicidal ideation and alcohol intoxication/withdrawal.  Patient reports that he has been struggling with his mental health and alcohol use.  He reports that he has been drinking half to 1 L of vodka per day with last drink being just prior to arrival.  He has a history of alcohol withdrawal and reports that in the past, he had to be put into a \"medically induced coma\" to manage this. He does feel like he might be experiencing some symptoms of withdrawal now and describes abdominal discomfort and shakiness.       Ruddy endorses ongoing thoughts of harming himself without a specific plan, though has had thoughts of jumping off a bridge, jumping off a building, and slitting his wrists.  He does not have any report of hallucinations or homicidal ideation.  He is not taking medications but used to take Sertraline, 200 mg and believes it helped alleviate depression.       He says he went to CD treatment in past but was angry and unhappy sober.  He attended AA and NA for awhile  He prefers NA program and is not opposed to attending again.  He is isolated.   He wants to feel and function better but is having a difficult time with the depression.  He is concerned about withdrawal and drinking again to avoid those symptoms.       He is willing to address MH and CD issues.  He is concerned about losing his VA housing at present but says he is open to going to detox with a MH component and addressing ETOH use there. He notes he has shut himself off from friends and family.  Ruddy was civilly committed in 2023 with [facility] for MI/CD but this was closed.    Clinical Substantiation:  After therapeutic assessment, intervention and aftercare planning by ED care team and [facility] and in consultation with attending provider, the patient's circumstances and mental state were appropriate for outpatient management.  Ruddy is concerned about losing his " current VA housing but also says he wants to address both his drinking and depression.  He is not actively suicidal but states he has lost a lot of hope that things can get better and states he has some apathy.  He is drinking heavily daily and the depression is untreated.  He has had withdrawal symptoms in past.  He is open to going to a detox with a mental health component.  He says he was on Sertraline in past and is open to getting back on a medication that can address depression.  He is open to returning to  and trying to get back into the stream of life that includes supportive others.     Goals for crisis stabilization:  concerns for withdrawal symptoms; patient safety, manage symptoms     Next steps for Care Team:  Patient Ruddy appears most appropriate for a detox program with  component like [facility] .  He is concerned about losing his VA housing but has contacted his VA counselor.      Per internal medicine provider consulted at time of admission for medical comanagement:    [facility]  Consult Note - Hospitalist Service  Date of Admission:  7/3/2025  Consult Requested by: Dr. Pang  Reason for Consult: detox     Assessment & Plan  Everette Gomez is a 49 year old male admitted on 7/3/2025. He has a past medical history of alcohol use disorder, HLD, MDD. Presented in the setting of alcohol withdrawal and suicidal ideation. Was admitted to  for detox. Internal medicine consulted for medical comanagement.   ______________________     # Alcohol withdrawal, hx of alcohol use disorder   # MDD, suicidal ideation   # Hypertension in the setting of withdrawal   Presented via EMS  with police escort for evaluation of suicidal ideation and alcohol intoxication/withdrawal. [facility] 4 this shift.  Denied hx of withdrawal seizures. No hx of Dts.  Blood EtOH on arrival was 0.28. Liver enzymes within normal limits. Patient reports that he has been struggling with his  "mental health and alcohol use. He reports that he has been drinking half to 1 L of vodka per day with last drink being just prior to arrival to the [place] 07/02. He has a history of alcohol withdrawal and reports that in the past, he had to be put into a \"medically induced coma\" to manage this. On arrival, did have suicidal ideation with thoughts of jumping off a bridge, jumping off a building, and slitting his wrists. UDS positive for benzos and cocaine.   - Continue MSSA with valium   - Folvite, multi-vites, thiamine supplementation   - PTA hydroxyzine PRN, sertraline daily, and trazodone at bedtime PRN per Psychiatry   - Further management per Psychiatry   - Suicide precautions   - PRN clonidine added for hypertension      # Elevated blood pressure  Patient denies personal history of high blood pressure.  Denies headache, vision changes, chest pain, dyspnea.  Suspect elevated blood pressure in the setting of acute withdrawal.  - As needed clonidine 0.1 mg twice daily as needed for elevated blood pressure  - Please reach out to Medicine team if patient's blood pressure is persistently elevated >180 / >105 and patient is out of withdrawal or if patient develops symptoms related to high blood pressure        # Elevated anion gap   With acidosis. Suspect in the setting of above. No need for recheck at this time.   - Encourage alcohol cessation, PO intake      # Hyperlipidemia  Lipid screen in 2023 demonstrated cholesterol 214, triglycerides 211.   - Not currently on statin  - Follow up with PCP for repeat labs, discussion on ways to lower lipids including alcohol cessation  (referral place)     # Nicotine use disorder: declined NRT, no cravings      Resolved:   # RUQ abdominal discomfort, resolved   Reported as abdominal cramping with loose stools, which has now resolved. LFTs within normal limits and resolution of abdominal pain.   -Please reach out to Medicine team if abdominal pain returns of patient develops " "nausea, vomiting      Medicine team will sign off at this time. Thank you for the consult. Please reach out to Medicine team if further questions or concerns arise.            The patient's care was discussed with the Bedside Nurse, Patient, and Primary team.        CHEMICAL DEPENDENCY HISTORY   History   Drug Use Not on file   Patient reports alcohol use described as, \"a liter of vodka per day for the past few days.\"    Social History    Substance and Sexual Activity      Alcohol use: Not on file      History   Smoking Status    Not on file   Smokeless Tobacco    Not on file       Treatment: Patient reports he has completed substance use disorder treatment programs multiple times in the past.  Detox: Patient reports he has completed inpatient detox in the past for management of alcohol withdrawal  Legal: None reported       PAST PSYCHIATRIC HISTORY   Psychiatrist: Unknown  Therapist: None reported  Case Management: None reported  Hospitalizations: None  History of Commitment: Yes, per chart review patient has a history of commitment and had the county  Past Medications: Sertraline, hydroxyzine, trazodone  ECT: None  Suicide Attempts/Gun Access: None/does not endorse gun access  Community Supports: His stable housing.       PAST MEDICAL HISTORY   No past medical history on file.    No past surgical history on file.    Primary Care Provider: No Ref-Primary, Physician  Medications:   Current Facility-Administered Medications   Medication Dose Route Frequency Provider Last Rate Last Admin    folic acid (FOLVITE) tablet 1 mg  1 mg Oral Daily Rajendra Pang MD   1 mg at 07/06/25 0805    multivitamin w/minerals (THERA-VIT-M) tablet 1 tablet  1 tablet Oral Daily Rajendra Pang MD   1 tablet at 07/06/25 0805    sertraline (ZOLOFT) tablet 25 mg  25 mg Oral Daily Lovely Singer APRN CNP   25 mg at 07/06/25 0805    thiamine (B-1) tablet 100 mg  100 mg Oral Daily Rajendra Pang MD   100 mg at 07/06/25 " 0805     Medications as needed:   Current Facility-Administered Medications   Medication Dose Route Frequency Provider Last Rate Last Admin    acetaminophen (TYLENOL) tablet 650 mg  650 mg Oral Q4H PRN Rajendra Pang MD   650 mg at 07/03/25 1449    alum & mag hydroxide-simethicone (MAALOX) suspension 30 mL  30 mL Oral Q4H PRN Rajendra Pang MD        cloNIDine (CATAPRES) tablet 0.1 mg  0.1 mg Oral BID PRN Jose D Carl PA-C        diazepam (VALIUM) tablet 5-20 mg  5-20 mg Oral Q30 Min PRN Rajendra Pang MD   10 mg at 07/04/25 0032    hydrOXYzine HCl (ATARAX) tablet 25-50 mg  25-50 mg Oral Q4H PRN Lovely Singer APRN CNP   50 mg at 07/06/25 0020    loperamide (IMODIUM) capsule 2 mg  2 mg Oral 4x Daily PRN Rajendra Pang MD        ondansetron (ZOFRAN ODT) ODT tab 4 mg  4 mg Oral Q6H PRN Rajendra Pang MD        senna-docusate (SENOKOT-S/PERICOLACE) 8.6-50 MG per tablet 1 tablet  1 tablet Oral BID PRN Rajendra aPng MD        traZODone (DESYREL) tablet  mg   mg Oral At Bedtime PRN Lovely Singer APRN CNP   50 mg at 07/05/25 2221       ALLERGIES: Patient has no known allergies.       MEDICATIONS   Current Facility-Administered Medications   Medication Dose Route Frequency Provider Last Rate Last Admin    acetaminophen (TYLENOL) tablet 650 mg  650 mg Oral Q4H PRN Rajendra Pang MD   650 mg at 07/03/25 1449    alum & mag hydroxide-simethicone (MAALOX) suspension 30 mL  30 mL Oral Q4H PRN Rajendra Pang MD        cloNIDine (CATAPRES) tablet 0.1 mg  0.1 mg Oral BID PRN Jose D Carl PA-C        diazepam (VALIUM) tablet 5-20 mg  5-20 mg Oral Q30 Min PRN Rajendra Pang MD   10 mg at 07/04/25 0032    folic acid (FOLVITE) tablet 1 mg  1 mg Oral Daily Rajendra Pang MD   1 mg at 07/06/25 0805    hydrOXYzine HCl (ATARAX) tablet 25-50 mg  25-50 mg Oral Q4H PRN Lovely Singer APRN CNP   50 mg at 07/06/25 0020    loperamide (IMODIUM)  capsule 2 mg  2 mg Oral 4x Daily PRN Rajendra Pang MD        multivitamin w/minerals (THERA-VIT-M) tablet 1 tablet  1 tablet Oral Daily Rajendra Pang MD   1 tablet at 07/06/25 0805    ondansetron (ZOFRAN ODT) ODT tab 4 mg  4 mg Oral Q6H PRN Rajendra Pang MD        senna-docusate (SENOKOT-S/PERICOLACE) 8.6-50 MG per tablet 1 tablet  1 tablet Oral BID PRN Rajendra Pang MD        sertraline (ZOLOFT) tablet 25 mg  25 mg Oral Daily Lovely Singer APRN CNP   25 mg at 07/06/25 0805    thiamine (B-1) tablet 100 mg  100 mg Oral Daily Rajendra Pang MD   100 mg at 07/06/25 0805    traZODone (DESYREL) tablet  mg   mg Oral At Bedtime PRN Lovely Singer APRN CNP   50 mg at 07/05/25 2221        Medication adherence issues: MS Med Adherence Y/N: Yes, Patient reports a history of noncompliance with psychiatric medications in the community  Medication side effects: MEDICATION SIDE EFFECTS: no side effects reported  Benefit: Yes / No: Yes       ROS   Pertinent items are noted in HPI.       FAMILY HISTORY   No family history on file.     Psychiatric: Unknown  Chemical: Unknown  Suicide: Unknown       SOCIAL HISTORY   Social History     Socioeconomic History    Marital status: Single     Spouse name: Not on file    Number of children: Not on file    Years of education: Not on file    Highest education level: Not on file   Occupational History    Not on file   Tobacco Use    Smoking status: Not on file    Smokeless tobacco: Not on file   Substance and Sexual Activity    Alcohol use: Not on file    Drug use: Not on file    Sexual activity: Not on file   Other Topics Concern    Not on file   Social History Narrative    Not on file     Social Drivers of Health     Financial Resource Strain: Low Risk  (7/3/2025)    Financial Resource Strain     Within the past 12 months, have you or your family members you live with been unable to get utilities (heat, electricity) when it was  really needed?: No   Food Insecurity: Low Risk  (7/3/2025)    Food Insecurity     Within the past 12 months, did you worry that your food would run out before you got money to buy more?: No     Within the past 12 months, did the food you bought just not last and you didn t have money to get more?: No   Transportation Needs: Low Risk  (7/3/2025)    Transportation Needs     Within the past 12 months, has lack of transportation kept you from medical appointments, getting your medicines, non-medical meetings or appointments, work, or from getting things that you need?: No   Physical Activity: Not on file   Stress: Not on file   Social Connections: Not on file   Interpersonal Safety: Low Risk  (7/3/2025)    Interpersonal Safety     Do you feel physically and emotionally safe where you currently live?: Yes     Within the past 12 months, have you been hit, slapped, kicked or otherwise physically hurt by someone?: No     Within the past 12 months, have you been humiliated or emotionally abused in other ways by your partner or ex-partner?: No   Housing Stability: High Risk (7/3/2025)    Housing Stability     Do you have housing? : No     Are you worried about losing your housing?: No       Born and Raised: [place]  Occupation: Unemployed  Marital Status:   Children: Per chart review, patient has reported having children  Legal: None reported  Living Situation: Living arrangements - the patient reports he has been living in transitional housing  ASSETS/STRENGTHS: Help seeking       MENTAL STATUS EXAM   Appearance:  Disheveled  Mood:  {Mood: Depressed   Affect: blunted and guarded  was congruent to speech  Suicidal Ideation: PRESENT / ABSENT: absent   Homicidal Ideation: PRESENT / ABSENT: absent   Thought process: Slightly circumstantial and tangential however logical  Thought content: denies suicidal and violent ideation.   Fund of Knowledge: Average  Attention/Concentration: Normal  Language ability:   "Intact  Memory: Appears intact, not formally assessed  Insight: Limited  Judgement: fair  Orientation: Person:  yes  Place:  yes  Time:  yes  Situation:  yes  Psychomotor Behavior: normal or unremarkable    Muscle Strength and Tone: MuscleStrength: Normal  Gait and Station: Normal         PHYSICAL EXAM   Vitals: /77   Pulse 60   Temp 97.6  F (36.4  C) (Oral)   Resp 16   SpO2 98%     Physical exam as per Betty Suero MD. Dated 7/2/2025:    Vitals: BP (!) 161/105   Pulse 92   Temp 97.5  F (36.4  C) (Oral)   Resp 20   Wt 108.6 kg (239 lb 8 oz)   SpO2 97%   BMI 36.42 kg/m    General: Awake, alert, interactive. Standing up at bedside. Pacing. Appears anxious and paranoid.   HENT: Atraumatic. MMM.   Neck: Full AROM.  Cardiovascular: Regular rate.  Respiratory/Chest: Normal work of breathing.   Abdomen: Non-distended.   Musculoskeletal: Normal appearing extremities without obvious deformities or signs of trauma.   Skin: Normal color. No rash or diaphoresis.  Neurologic: Alert, oriented. Speech clear. CN's grossly intact. Moving all extremities spontaneously.   Psychiatric: Patient reports mood as \"depressed.\" Affect appears flat,  congruent with mood. Eye contact fair. Does not appear to be responding to internal stimuli but appears paranoid. Thought process and content organized, no delusions. Speech normal, not tangential or pressured. Endorses passive suicidal ideation with various plan. Denies homicidal ideation, visual hallucinations, or auditory hallucinations.             LABS   personally reviewed.      Latest Reference Range & Units 07/02/25 21:55 07/03/25 10:21 07/03/25 11:01 07/04/25 07:17   Sodium 135 - 145 mmol/L 140      Potassium 3.4 - 5.3 mmol/L 4.1      Chloride 98 - 107 mmol/L 100      Carbon Dioxide (CO2) 22 - 29 mmol/L 22      Urea Nitrogen 6.0 - 20.0 mg/dL 12.1      Creatinine 0.67 - 1.17 mg/dL 0.85      GFR Estimate >60 mL/min/1.73m2 >90      Calcium 8.8 - 10.4 mg/dL 9.1      Anion Gap " "7 - 15 mmol/L 18 (H)      Magnesium 1.7 - 2.3 mg/dL 2.2      Phosphorus 2.5 - 4.5 mg/dL 3.6      Albumin 3.5 - 5.2 g/dL 4.8      Protein Total 6.4 - 8.3 g/dL 7.5      Alkaline Phosphatase 40 - 150 U/L 86      ALT 0 - 70 U/L 54      AST 0 - 45 U/L 45      Bilirubin Total <=1.2 mg/dL 0.4      Cholesterol <200 mg/dL    248 (H)   Glucose 70 - 99 mg/dL 104 (H)      HDL Cholesterol >=40 mg/dL    56   Estimated Average Glucose <117 mg/dL    114   Hemoglobin A1C <5.7 %    5.6   LDL Cholesterol Calculated     See Comment   Non HDL Cholesterol <130 mg/dL    192 (H)   Triglycerides <150 mg/dL    492 (H)   TSH 0.30 - 4.20 uIU/mL    3.04   GLUCOSE BY METER POCT 70 - 99 mg/dL   112 (H)    WBC 4.0 - 11.0 10e3/uL 5.7      Hemoglobin 13.3 - 17.7 g/dL 15.6   13.1 (L)   Hematocrit 40.0 - 53.0 % 43.5      Platelet Count 150 - 450 10e3/uL 191      RBC Count 4.40 - 5.90 10e6/uL 4.83      MCV 78 - 100 fL 90   93   MCH 26.5 - 33.0 pg 32.3      MCHC 31.5 - 36.5 g/dL 35.9      RDW 10.0 - 15.0 % 12.9      Amphetamine Qual Urine Screen Negative   Screen Negative     Fentanyl Qual Urine Screen Negative   Screen Negative     Cocaine Urine Screen Negative   Screen Positive !     Benzodiazepine Urine Screen Negative   Screen Positive !     Opiates Qualitative Urine Screen Negative   Screen Negative     PCP Urine Screen Negative   Screen Negative     Cannabinoids Qual Urine Screen Negative   Screen Negative     Barbiturates Qual Urine Screen Negative   Screen Negative     Ethanol Level Blood <=0.01 g/dL 0.28 (H)      (H): Data is abnormally high  (L): Data is abnormally low  !: Data is abnormal    No results found for: \"PHENYTOIN\", \"PHENOBARB\", \"VALPROATE\", \"CBMZ\"                         "

## 2025-07-06 NOTE — PLAN OF CARE
Problem: Alcohol Withdrawal  Goal: Alcohol Withdrawal Symptom Control  Outcome: Progressing  Intervention: Minimize or Manage Alcohol Withdrawal Symptoms  Recent Flowsheet Documentation  Taken 7/5/2025 1623 by Esther Carvalho RN  Seizure Precautions: clutter-free environment maintained     Problem: Alcohol Withdrawal  Goal: Readiness for Change Identified  Outcome: Progressing   Goal Outcome Evaluation:    Plan of Care Reviewed With: patient        MSSA scores  today are 2 and 2. No valium administered this evening. Pt is calm and cooperative sat in dining room most of evening reading a book.Reports an overall good appetite and denies hallucinations.     Vitals are /75 (BP Location: Left arm)   Pulse 78   Temp 98.4  F (36.9  C) (Oral)   Resp 16   SpO2 95%   Stated he had headache pain but declined any medications.      Attended OT session this afternoon.    Trazodone given before bed.     Pt reports anxiety rated 4 of 10 in severity and depression 4 of 10 in severity. Pt denies any suicidal ideation plans or intent. Endorses willingness to alert staff if he develops any SI/SIB while hospitalized (contracts for safety).     Pt reports plan for after detox as wanted Detox only.     Pt reports no further concerns at this time.

## 2025-07-06 NOTE — PLAN OF CARE
Problem: Adult Behavioral Health Plan of Care  Goal: Plan of Care Review  Outcome: Progressing   Goal Outcome Evaluation:         Pt out of detox and discharging  non med, to home this shift. Pt denies pain, endorsing a good appetite and was encouraged to make a PCP appointment for follow up care. Pt med compliant with  discharge meds and instructions reviewed with pt.    Patient alert, denies SI/HI, belongings returned after lunch.   Blood pressure 117/77, pulse 60, temperature 97.6  F (36.4  C), temperature source Oral, resp. rate 16, SpO2 98%.     Patient will be transporting home via bus. Pt escorted off the unit by Rajendra AVALOS